# Patient Record
Sex: MALE | URBAN - METROPOLITAN AREA
[De-identification: names, ages, dates, MRNs, and addresses within clinical notes are randomized per-mention and may not be internally consistent; named-entity substitution may affect disease eponyms.]

---

## 2017-01-06 ENCOUNTER — TRANSFERRED RECORDS (OUTPATIENT)
Dept: HEALTH INFORMATION MANAGEMENT | Facility: CLINIC | Age: 42
End: 2017-01-06

## 2017-03-09 ENCOUNTER — MEDICAL CORRESPONDENCE (OUTPATIENT)
Dept: HEALTH INFORMATION MANAGEMENT | Facility: CLINIC | Age: 42
End: 2017-03-09

## 2017-03-15 ENCOUNTER — PRE VISIT (OUTPATIENT)
Dept: NEUROLOGY | Facility: CLINIC | Age: 42
End: 2017-03-15

## 2017-03-15 NOTE — TELEPHONE ENCOUNTER
1.  Date/reason for appt: 3/27/17 muscle cramps & occasional fasciculations   2.  Referring provider: DONNA DUMAS   3.  Call to patient (Yes / No - short description): no, referral   4.  Previous care at / records requested from: [facility]   5.  Other: Records received from [facility].   Included  Office notes: 1/6/17  Other: EMG report on 1/6/17    Faxed request for additional records.

## 2017-03-17 NOTE — TELEPHONE ENCOUNTER
Imaging disc received from [facility], sent to film room  [facility]  --  10/18/15

## 2017-03-19 ASSESSMENT — ENCOUNTER SYMPTOMS
HEARTBURN: 0
LEG PAIN: 1
RECTAL PAIN: 0
MUSCLE WEAKNESS: 1
HEADACHES: 0
VOMITING: 0
HYPOTENSION: 0
NERVOUS/ANXIOUS: 1
WEAKNESS: 0
ALTERED TEMPERATURE REGULATION: 1
FATIGUE: 1
STIFFNESS: 1
CHILLS: 0
DECREASED CONCENTRATION: 0
DIARRHEA: 1
CONSTIPATION: 1
BLOATING: 1
RECTAL BLEEDING: 0
SLEEP DISTURBANCES DUE TO BREATHING: 0
NIGHT SWEATS: 1
TREMORS: 0
DECREASED APPETITE: 0
INCREASED ENERGY: 1
BLOOD IN STOOL: 0
SEIZURES: 0
POLYPHAGIA: 1
JOINT SWELLING: 0
HYPERTENSION: 0
ABDOMINAL PAIN: 1
HALLUCINATIONS: 0
MUSCLE CRAMPS: 1
BOWEL INCONTINENCE: 0
JAUNDICE: 0
LOSS OF CONSCIOUSNESS: 0
LEG SWELLING: 1
INSOMNIA: 1
MYALGIAS: 1
MEMORY LOSS: 0
CLAUDICATION: 0
DIZZINESS: 0
PARALYSIS: 0
DEPRESSION: 0
EXERCISE INTOLERANCE: 0
LIGHT-HEADEDNESS: 0
TACHYCARDIA: 0
NAUSEA: 0
PALPITATIONS: 0
ARTHRALGIAS: 1
TINGLING: 0
DISTURBANCES IN COORDINATION: 0
SPEECH CHANGE: 1
WEIGHT GAIN: 0
FEVER: 0
BACK PAIN: 0
WEIGHT LOSS: 0
SYNCOPE: 0
ORTHOPNEA: 0
POLYDIPSIA: 0
NECK PAIN: 0
PANIC: 1
NUMBNESS: 0

## 2017-03-27 ENCOUNTER — OFFICE VISIT (OUTPATIENT)
Dept: NEUROLOGY | Facility: CLINIC | Age: 42
End: 2017-03-27

## 2017-03-27 VITALS
WEIGHT: 251 LBS | RESPIRATION RATE: 20 BRPM | SYSTOLIC BLOOD PRESSURE: 148 MMHG | HEART RATE: 93 BPM | HEIGHT: 71 IN | TEMPERATURE: 98.2 F | DIASTOLIC BLOOD PRESSURE: 87 MMHG | BODY MASS INDEX: 35.14 KG/M2 | OXYGEN SATURATION: 94 %

## 2017-03-27 DIAGNOSIS — R25.3 BENIGN FASCICULATION-CRAMP SYNDROME: Primary | ICD-10-CM

## 2017-03-27 DIAGNOSIS — R25.3 BENIGN FASCICULATION-CRAMP SYNDROME: ICD-10-CM

## 2017-03-27 DIAGNOSIS — R20.9 DISTURBANCE OF SKIN SENSATION: ICD-10-CM

## 2017-03-27 RX ORDER — FLUTICASONE PROPIONATE 50 MCG
1 SPRAY, SUSPENSION (ML) NASAL PRN
COMMUNITY
Start: 2013-04-01

## 2017-03-27 RX ORDER — AMITRIPTYLINE HYDROCHLORIDE 10 MG/1
3 TABLET ORAL DAILY
COMMUNITY
Start: 2016-10-17

## 2017-03-27 RX ORDER — CARBAMAZEPINE 100 MG/1
2 TABLET, EXTENDED RELEASE ORAL 2 TIMES DAILY
COMMUNITY
Start: 2017-01-06 | End: 2018-12-13

## 2017-03-27 ASSESSMENT — PAIN SCALES - GENERAL: PAINLEVEL: MILD PAIN (3)

## 2017-03-27 NOTE — LETTER
3/27/2017       RE: [facility] Blair  [address] [facility] [facility] [address]     Dear Colleague,    Thank you for referring your patient, Yaron Blair, to the [facility] NEUROLOGY at [facility]. Please see a copy of my visit note below.    Answers for HPI/ROS submitted by the patient on 3/19/2017   General Symptoms: Yes  Skin Symptoms: No  HENT Symptoms: No  EYE SYMPTOMS: No  HEART SYMPTOMS: Yes  LUNG SYMPTOMS: No  INTESTINAL SYMPTOMS: Yes  URINARY SYMPTOMS: No  REPRODUCTIVE SYMPTOMS: No  SKELETAL SYMPTOMS: Yes  BLOOD SYMPTOMS: No  NERVOUS SYSTEM SYMPTOMS: Yes  MENTAL HEALTH SYMPTOMS: Yes  Fever: No  Loss of appetite: No  Weight loss: No  Weight gain: No  Fatigue: Yes  Night sweats: Yes  Chills: No  Increased stress: No  Excessive hunger: Yes  Excessive thirst: No  Feeling hot or cold when others believe the temperature is normal: Yes  Loss of height: No  Post-operative complications: No  Surgical site pain: No  Hallucinations: No  Change in or Loss of Energy: Yes  Hyperactivity: No  Confusion: No  Chest pain or pressure: No  Fast or irregular heartbeat: No  Pain in legs with walking: Yes  Swelling in feet or ankles: Yes  Trouble breathing while lying down: No  Fingers or Toes appear blue: No  High blood pressure: No  Low blood pressure: No  Fainting: No  Murmurs: No  Chest pain on exertion: No  Chest pain at rest: No  Cramping pain in leg during exercise: No  Pacemaker: No  Varicose veins: No  Edema or swelling: Yes  Fast heart beat: No  Wake up at night with shortness of breath: No  Heart flutters: No  Light-headedness: No  Exercise intolerance: No  Heart burn or indigestion: No  Nausea: No  Vomiting: No  Abdominal pain: Yes  Bloating: Yes  Constipation: Yes  Diarrhea: Yes  Blood in stool: No  Black stools: No  Rectal or Anal pain: No  Fecal incontinence: No  Rectal bleeding: No  Yellowing of skin or eyes: No  Vomit with blood: No  Change in stools: No  Hemorrhoids: No  Back pain:  No  Muscle aches: Yes  Neck pain: No  Swollen joints: No  Joint pain: Yes  Bone pain: No  Muscle cramps: Yes  Muscle weakness: Yes  Joint stiffness: Yes  Bone fracture: No  Trouble with coordination: No  Dizziness or trouble with balance: No  Fainting or black-out spells: No  Memory loss: No  Headache: No  Seizures: No  Speech problems: Yes  Tingling: No  Tremor: No  Weakness: No  Difficulty walking: No  Paralysis: No  Numbness: No  Nervous or Anxious: Yes  Depression: No  Trouble sleeping: Yes  Trouble thinking or concentrating: No  Mood changes: No  Panic attacks: Yes      2017      Chriss Jerez MD   [facility]    [address]   [place] [address]      RE: Yaron Blair    MRN: [id number]    : 1975      Dear Dr. Jerez:      Thank you for referring Mr. Blair to the [facility] today.  As you know, he is a very pleasant 42-year-old man who gives the following story:  About 4 years ago, he developed intermittent tingling in his hands and feet and a sensation of stiffness, mostly in his calf muscles and occasionally the forearms.  Two years ago he noticed some fatigue when going up steps or trying to lift heavy objects and after exercise he would experience the same muscle stiffness in forearm, shoulders, hips and thighs, but again, mostly in the calves.  For the last year or so, his right calf has been frequently twitching and less so the left.  He has intermittent twitching in other muscles of the body and the same stiff sensation.  The tingling in the feet also returned.  It remains intermittent.  It does not occur at night.  It is mostly when he is up on his feet.  He is also experiencing random or evanescent jaw pains and neck stiffness.  He does not recall difficulty opening his .  He also noticed thinning of the muscles on the left hand and forearm.  He does not report any paresthesias in the perineal region, trunk, face or  upper extremities now.  There is no incontinence of bowel or bladder.  No dysphagia, dysarthria, sialorrhea, head drop, ptosis, diplopia or any other cranial nerve symptoms.  He does not endorse any difficulties using his lower extremities.  He does not have trouble getting up from a low seated chair or ascending stairs, turning in bed, no tripping on the feet or falls. He does not drop things from the hands and reports no difficulties flexing his elbow or lifting his arms overhead.  He denies symptoms suggestive of gastroparesis, orthostatic intolerance or syncope, dry eyes, dry mouth or erectile dysfunction.  He has no other autonomic symptoms.  He has obstructive sleep apnea for 12 years on CPAP treatment.  He had a repeat sleep study 5 years ago and things were going well.  Also, he has a history of acid reflux on omeprazole.      In the past, he was given amitriptyline by a rheumatologist for the purpose of consolidating his sleep.  It has not worked very well.  He previously tried gabapentin 1 pill in the evening only to help his sleep with poor effect.  He has not been given t.i.d. gabapentin or Lyrica with the intent to treat the muscle stiffness or the twitching.  You started him on Tegretol initially 100 b.i.d. with limited to no relief.  Now, he is on 200 mg b.i.d. for the last week without substantial changes.  He has no side effects from the Tegretol.      EMG performed at [facility] on 01/06/2017 was entirely normal at the left upper and right lower extremity with only occasional fasciculations in the left biceps, right gastrocnemius and vastus medialis, which can occur in normal individuals.  There was no evidence of sensory motor neuropathy or myopathy.  His CK was 262, which frankly for a well built man in his 40s is normal. There is a history of an elevated VERNON.  He had negative myasthenia antibodies, TSH, B12 and vitamin D.        PAST MEDICAL HISTORY:   Significant for obstructive  sleep apnea, appendectomy and ankle surgeries, 1993 and 1994.      CURRENT MEDICATIONS:   1.  Amitriptyline.     2.  Flonase.     3.  Omeprazole.     4.  Tegretol 200 mg b.i.d.      ALLERGIES:  Penicillin, reaction unknown, and Cipro, reaction shoulder pain.      REVIEW OF SYSTEMS:  A 14-point review of systems is negative except per HPI.      FAMILY HISTORY:  Negative for similar complaints or neurodegenerative disease such as ALS, negative for myopathy.      SOCIAL HISTORY:   He does not smoke, no alcohol use, no illicit drug use.      PHYSICAL EXAMINATION:   VITAL SIGNS:   His blood pressure is 148/87.  Pulse is 93 and regular.  He weighs 113 kg.  Height is 180.  Respiratory rate 20, O2 sat 94% on room air.  Pain score 3/10.   NEUROLOGIC:  He is awake, alert and oriented x3.  Cranial nerve examination II-XII are normal.  Motor exam shows normal muscle strength, tone and bulk throughout.  Reflexes were 1+ and symmetric in biceps, triceps, brachioradialis, 2 at the knees and ankles.  Plantar responses were bilaterally flexor.  He had no Crespo reflex or other pathologic reflexes.  I actually did not see any fasciculations in arms and legs and no muscle atrophy.  He had no hand , thenar or extensor digitorum communis percussion myotonia, no lid myotonia or lid lag.  He had a bulky muscle physique overall.  His sensory exam showed intact position sensation in the toes.  Vibration was probably a little reduced, 4-5 seconds at the right great toe, 6 at the left, 8 seconds at the medial malleoli, and 18-19 at the index fingers (normal).  Pinprick was probably slightly reduced at the tips of the toes compared to the mid-calves and this finding was reproducible on multiple attempts.  Coordination was intact in finger-to-nose.  I did not see any tremor.  There was no bradykinesia, or cogwheel rigidity.  He was able to get up from a chair with arms crossed on the chest without difficulty.  Tandem gait was normal.   Romberg sign negative.      IMPRESSION:   1.  Benign cramp fasciculation syndrome.   2.  Rule out small fiber neuropathy.      I spent about 35-40 minutes with the patient, of which more than half was counseling.       I told him that we often do not find a satisfactory explanation for people complaining of intermittent muscle stiffness and tingling when their neuro exams are unremarkable.  I agree with the concern about cramp fasciculation syndrome.  I told him that this is a benign yet somewhat annoying situation that is managed symptomatically.  It is caused by peripheral nerve hyperexcitability and not by muscle disease.  The cause of this hyperexcitability is most of the times unknown. Sometimes there is a family history or trait, sometimes it can be autoimmune and the latter diagnosis should not be missed.  Along those lines, I will check voltage gated potassium channel antibodies today.    His neuro exam shows a mild reduction of pin and tactile sensation in the toes.  He may have a mild small fiber neuropathy despite the negative EMG.  I think it is worth doing a skin biopsy for a couple of reasons.  If this is confirmed, then it predicts a better chance of response of his symptoms to medications such as Tegretol, Lyrica, etc.  If neuropathy is found, I think we should also do some additional blood testing including protein immunofixation and an oral glucose tolerance test for diabetes or further work him up for systemic autoimmune diseases.      I will carry the above plan.  If nothing is found, he will return to your care.  I concur with your decision to use Tegretol and I personally would push the dose to 400 mg b.i.d. to t.i.d. before concluding it does not work.  Liver function tests and sodium need to be monitored with the higher doses and there is an increased risk of dizziness or CNS side effects with the higher dose.  If Tegretol is not effective, consider Lyrica or putting him back on a t.i.d.  Gabapentin regimen.      Thank you for allowing me to participate in the care of this patient.  His CK of 262 is probably normal and I have no suspicion for muscle disease in this case (therefore muscle biopsy would be of no utility).      Sincerely,       Aquilino Webster MD      cc:   Daniella Ornelas MD      D: 2017 11:47   T: 2017 14:44   MT: GAV[place]OTA      Name:     MUSA PRESCOTT   MRN:      [id number]        Account:      [id number]   :      1975           Service Date: 2017      Document: [id number]

## 2017-03-27 NOTE — NURSING NOTE
Chief Complaint   Patient presents with     Consult     UMP- MUSCLE WEAKNESS AND PAIN IN 4 EXTREMITIES

## 2017-03-27 NOTE — MR AVS SNAPSHOT
After Visit Summary   3/27/2017    Yaron Blair    MRN: [id number]           Patient Information     Date Of Birth          1975        Visit Information        Provider Department      3/27/2017 10:50 AM Aquilino Webster MD [facility] Neurology        Care Instructions    If you have questions or concerns following today's appointment, please contact   Pre[facility]inski at [phone].    For more urgent concerns, contact the neurology department triage line at [phone] option 3.    Follow up with Dr. Jerez.    Referrals/Ordered provided today: Labs and Skin Biopsy    Medication: Recommend that Dr. Jerez try increasing Tegretol OR try Lyrica.     We now have a  available to help patients with psychosocial needs, supportive counseling, advanced care planning, and insurance and/or disability questions. You can reach OLVIN Acosta, TARYN at [phone].             Follow-ups after your visit        Who to contact     Please call your clinic at [phone] to:    Ask questions about your health    Make or cancel appointments    Discuss your medicines    Learn about your test results    Speak to your doctor   If you have compliments or concerns about an experience at your clinic, or if you wish to file a complaint, please contact [facility] Patient Relations at [phone] or email us at [email or web address]         Additional Information About Your Visit        MyChart Information     "[employer] gives you secure access to your electronic health record. If you see a primary care provider, you can also send messages to your care team and make appointments. If you have questions, please call your primary care clinic.  If you do not have a primary care provider, please call [phone] and they will assist you.      "[employer] is an electronic gateway that provides easy, online access to your medical records. With "[employer] you can  "request a clinic appointment, read your test results, renew a prescription or communicate with your care team.     To access your existing account, please contact your [facility] or call [phone] for assistance.        Care EveryWhere ID     This is your Care EveryWhere ID. This could be used by other organizations to access your [facility] medical records  [id number]        Your Vitals Were     Pulse Temperature Respirations Height Pulse Oximetry BMI (Body Mass Index)    93 98.2  F (36.8  C) (Oral) 20 1.803 m (5' 11\") 94% 35.01 kg/m2       Blood Pressure from Last 3 Encounters:   03/27/17 148/87    Weight from Last 3 Encounters:   03/27/17 113.9 kg (251 lb)              Today, you had the following     No orders found for display       Primary Care Provider Office Phone # Fax #    Daniella Ornelas [phone]       [address] [address]        Thank you!     Thank you for choosing [facility] NEUROLOGY  for your care. Our goal is always to provide you with excellent care. Hearing back from our patients is one way we can continue to improve our services. Please take a few minutes to complete the written survey that you may receive in the mail after your visit with us. Thank you!             Your Updated Medication List - Protect others around you: Learn how to safely use, store and throw away your medicines at www.disposemymeds.org.          This list is accurate as of: 3/27/17 11:28 AM.  Always use your most recent med list.                   Brand Name Dispense Instructions for use    amitriptyline 10 MG tablet    ELAVIL     Take 3 tablets by mouth daily       carBAMazepine 100 MG 12 hr tablet    TEGretol XR     Take 2 tablets by mouth 2 times daily       FLONASE 50 MCG/ACT spray   Generic drug:  fluticasone      Spray 1 puff in nostril as needed       omeprazole 20 MG CR capsule    priLOSEC     Take 1 capsule by mouth daily         "

## 2017-03-27 NOTE — PATIENT INSTRUCTIONS
If you have questions or concerns following today's appointment, please contact   Pre[facility]i Kaminski at [phone].    For more urgent concerns, contact the neurology department triage line at [phone] option 3.    Follow up with Dr. Jerez.    Referrals/Ordered provided today: Labs and Skin Biopsy    Medication: Recommend that Dr. Jerez try increasing Tegretol OR try Lyrica.     We now have a  available to help patients with psychosocial needs, supportive counseling, advanced care planning, and insurance and/or disability questions. You can reach OLVIN Acosta, [facility] at [phone].

## 2017-03-27 NOTE — PROGRESS NOTES
2017      Chriss Jerez MD   [facility] Neurological [address]   [place] [address]      RE: Yaron Blair    MRN: [id number]    : 1975      Dear Dr. Jerez:      Thank you for referring Mr. Blair to the [facility]romuscular Clinic today.  As you know, he is a very pleasant 42-year-old man who gives the following story:  About 4 years ago, he developed intermittent tingling in his hands and feet and a sensation of stiffness, mostly in his calf muscles and occasionally the forearms.  Two years ago he noticed some fatigue when going up steps or trying to lift heavy objects and after exercise he would experience the same muscle stiffness in forearm, shoulders, hips and thighs, but again, mostly in the calves.  For the last year or so, his right calf has been frequently twitching and less so the left.  He has intermittent twitching in other muscles of the body and the same stiff sensation.  The tingling in the feet also returned.  It remains intermittent.  It does not occur at night.  It is mostly when he is up on his feet.  He is also experiencing random or evanescent jaw pains and neck stiffness.  He does not recall difficulty opening his .  He also noticed thinning of the muscles on the left hand and forearm.  He does not report any paresthesias in the perineal region, trunk, face or upper extremities now.  There is no incontinence of bowel or bladder.  No dysphagia, dysarthria, sialorrhea, head drop, ptosis, diplopia or any other cranial nerve symptoms.  He does not endorse any difficulties using his lower extremities.  He does not have trouble getting up from a low seated chair or ascending stairs, turning in bed, no tripping on the feet or falls. He does not drop things from the hands and reports no difficulties flexing his elbow or lifting his arms overhead.  He denies symptoms suggestive of gastroparesis, orthostatic intolerance or syncope, dry  eyes, dry mouth or erectile dysfunction.  He has no other autonomic symptoms.  He has obstructive sleep apnea for 12 years on CPAP treatment.  He had a repeat sleep study 5 years ago and things were going well.  Also, he has a history of acid reflux on omeprazole.      In the past, he was given amitriptyline by a rheumatologist for the purpose of consolidating his sleep.  It has not worked very well.  He previously tried gabapentin 1 pill in the evening only to help his sleep with poor effect.  He has not been given t.i.d. gabapentin or Lyrica with the intent to treat the muscle stiffness or the twitching.  You started him on Tegretol initially 100 b.i.d. with limited to no relief.  Now, he is on 200 mg b.i.d. for the last week without substantial changes.  He has no side effects from the Tegretol.      EMG performed at [facility] on 01/06/2017 was entirely normal at the left upper and right lower extremity with only occasional fasciculations in the left biceps, right gastrocnemius and vastus medialis, which can occur in normal individuals.  There was no evidence of sensory motor neuropathy or myopathy.  His CK was 262, which frankly for a well built man in his 40s is normal. There is a history of an elevated VERNON.  He had negative myasthenia antibodies, TSH, B12 and vitamin D.        PAST MEDICAL HISTORY:   Significant for obstructive sleep apnea, appendectomy and ankle surgeries, 1993 and 1994.      CURRENT MEDICATIONS:   1.  Amitriptyline.     2.  Flonase.     3.  Omeprazole.     4.  Tegretol 200 mg b.i.d.      ALLERGIES:  Penicillin, reaction unknown, and Cipro, reaction shoulder pain.      REVIEW OF SYSTEMS:  A 14-point review of systems is negative except per HPI.      FAMILY HISTORY:  Negative for similar complaints or neurodegenerative disease such as ALS, negative for myopathy.      SOCIAL HISTORY:   He does not smoke, no alcohol use, no illicit drug use.      PHYSICAL EXAMINATION:   VITAL  SIGNS:   His blood pressure is 148/87.  Pulse is 93 and regular.  He weighs 113 kg.  Height is 180.  Respiratory rate 20, O2 sat 94% on room air.  Pain score 3/10.   NEUROLOGIC:  He is awake, alert and oriented x3.  Cranial nerve examination II-XII are normal.  Motor exam shows normal muscle strength, tone and bulk throughout.  Reflexes were 1+ and symmetric in biceps, triceps, brachioradialis, 2 at the knees and ankles.  Plantar responses were bilaterally flexor.  He had no Crespo reflex or other pathologic reflexes.  I actually did not see any fasciculations in arms and legs and no muscle atrophy.  He had no hand , thenar or extensor digitorum communis percussion myotonia, no lid myotonia or lid lag.  He had a bulky muscle physique overall.  His sensory exam showed intact position sensation in the toes.  Vibration was probably a little reduced, 4-5 seconds at the right great toe, 6 at the left, 8 seconds at the medial malleoli, and 18-19 at the index fingers (normal).  Pinprick was probably slightly reduced at the tips of the toes compared to the mid-calves and this finding was reproducible on multiple attempts.  Coordination was intact in finger-to-nose.  I did not see any tremor.  There was no bradykinesia, or cogwheel rigidity.  He was able to get up from a chair with arms crossed on the chest without difficulty.  Tandem gait was normal.  Romberg sign negative.      IMPRESSION:   1.  Benign cramp fasciculation syndrome.   2.  Rule out small fiber neuropathy.      I spent about 35-40 minutes with the patient, of which more than half was counseling.       I told him that we often do not find a satisfactory explanation for people complaining of intermittent muscle stiffness and tingling when their neuro exams are unremarkable.  I agree with the concern about cramp fasciculation syndrome.  I told him that this is a benign yet somewhat annoying situation that is managed symptomatically.  It is caused by  peripheral nerve hyperexcitability and not by muscle disease.  The cause of this hyperexcitability is most of the times unknown. Sometimes there is a family history or trait, sometimes it can be autoimmune and the latter diagnosis should not be missed.  Along those lines, I will check voltage gated potassium channel antibodies today.    His neuro exam shows a mild reduction of pin and tactile sensation in the toes.  He may have a mild small fiber neuropathy despite the negative EMG.  I think it is worth doing a skin biopsy for a couple of reasons.  If this is confirmed, then it predicts a better chance of response of his symptoms to medications such as Tegretol, Lyrica, etc.  If neuropathy is found, I think we should also do some additional blood testing including protein immunofixation and an oral glucose tolerance test for diabetes or further work him up for systemic autoimmune diseases.      I will carry the above plan.  If nothing is found, he will return to your care.  I concur with your decision to use Tegretol and I personally would push the dose to 400 mg b.i.d. to t.i.d. before concluding it does not work.  Liver function tests and sodium need to be monitored with the higher doses and there is an increased risk of dizziness or CNS side effects with the higher dose.  If Tegretol is not effective, consider Lyrica or putting him back on a t.i.d. Gabapentin regimen.      Thank you for allowing me to participate in the care of this patient.  His CK of 262 is probably normal and I have no suspicion for muscle disease in this case (therefore muscle biopsy would be of no utility).      Sincerely,       Flaquita Webster MD      cc:   MD FLAQUITA Morales MD             D: 2017 11:47   T: 2017 14:44   MT: GAVIOTA      Name:     MUSA PRESCOTT   MRN:      [id number]        Account:      [id number]   :      1975           Service Date: 2017      Document:  [id number]

## 2017-03-28 NOTE — PROGRESS NOTES
Answers for HPI/ROS submitted by the patient on 3/19/2017   General Symptoms: Yes  Skin Symptoms: No  HENT Symptoms: No  EYE SYMPTOMS: No  HEART SYMPTOMS: Yes  LUNG SYMPTOMS: No  INTESTINAL SYMPTOMS: Yes  URINARY SYMPTOMS: No  REPRODUCTIVE SYMPTOMS: No  SKELETAL SYMPTOMS: Yes  BLOOD SYMPTOMS: No  NERVOUS SYSTEM SYMPTOMS: Yes  MENTAL HEALTH SYMPTOMS: Yes  Fever: No  Loss of appetite: No  Weight loss: No  Weight gain: No  Fatigue: Yes  Night sweats: Yes  Chills: No  Increased stress: No  Excessive hunger: Yes  Excessive thirst: No  Feeling hot or cold when others believe the temperature is normal: Yes  Loss of height: No  Post-operative complications: No  Surgical site pain: No  Hallucinations: No  Change in or Loss of Energy: Yes  Hyperactivity: No  Confusion: No  Chest pain or pressure: No  Fast or irregular heartbeat: No  Pain in legs with walking: Yes  Swelling in feet or ankles: Yes  Trouble breathing while lying down: No  Fingers or Toes appear blue: No  High blood pressure: No  Low blood pressure: No  Fainting: No  Murmurs: No  Chest pain on exertion: No  Chest pain at rest: No  Cramping pain in leg during exercise: No  Pacemaker: No  Varicose veins: No  Edema or swelling: Yes  Fast heart beat: No  Wake up at night with shortness of breath: No  Heart flutters: No  Light-headedness: No  Exercise intolerance: No  Heart burn or indigestion: No  Nausea: No  Vomiting: No  Abdominal pain: Yes  Bloating: Yes  Constipation: Yes  Diarrhea: Yes  Blood in stool: No  Black stools: No  Rectal or Anal pain: No  Fecal incontinence: No  Rectal bleeding: No  Yellowing of skin or eyes: No  Vomit with blood: No  Change in stools: No  Hemorrhoids: No  Back pain: No  Muscle aches: Yes  Neck pain: No  Swollen joints: No  Joint pain: Yes  Bone pain: No  Muscle cramps: Yes  Muscle weakness: Yes  Joint stiffness: Yes  Bone fracture: No  Trouble with coordination: No  Dizziness or trouble with balance: No  Fainting or black-out spells:  No  Memory loss: No  Headache: No  Seizures: No  Speech problems: Yes  Tingling: No  Tremor: No  Weakness: No  Difficulty walking: No  Paralysis: No  Numbness: No  Nervous or Anxious: Yes  Depression: No  Trouble sleeping: Yes  Trouble thinking or concentrating: No  Mood changes: No  Panic attacks: Yes

## 2017-04-03 ENCOUNTER — TELEPHONE (OUTPATIENT)
Dept: NEUROLOGY | Facility: CLINIC | Age: 42
End: 2017-04-03

## 2017-04-03 LAB — VGKC AB SER-SCNC: 41 PMOL/L

## 2017-04-03 NOTE — TELEPHONE ENCOUNTER
Voltage gated potassium came back positive but only mild. So, we need to do the CASPR2 antibody test to clarify this result.    If this test is negative, the original voltage gated potassium should be dismissed and patient should follow with Dr. Jerez.    If this test is positive, patient should follow up with Dr. Webster in Monday [facility] clinic.

## 2017-04-05 ENCOUNTER — CARE COORDINATION (OUTPATIENT)
Dept: NEUROLOGY | Facility: CLINIC | Age: 42
End: 2017-04-05

## 2017-04-05 DIAGNOSIS — R25.3 BENIGN FASCICULATION-CRAMP SYNDROME: Primary | ICD-10-CM

## 2017-04-05 NOTE — PROGRESS NOTES
Orders were faxed to [facility] for a home draw.      Cynthia Montano MS, [facility]  Genetic Counselor  Phone: [phone]

## 2017-04-12 ENCOUNTER — TELEPHONE (OUTPATIENT)
Dept: NEUROLOGY | Facility: CLINIC | Age: 42
End: 2017-04-12

## 2017-04-13 NOTE — TELEPHONE ENCOUNTER
Lab is unable to draw at home because of his insurance.  Called to make arrangements to draw at local clinic.     Cynthia Montano MS, [facility]  Genetic Counselor  Phone: [phone]

## 2017-04-18 ENCOUNTER — TELEPHONE (OUTPATIENT)
Dept: NEUROLOGY | Facility: CLINIC | Age: 42
End: 2017-04-18

## 2017-04-18 ENCOUNTER — OFFICE VISIT (OUTPATIENT)
Dept: NEUROLOGY | Facility: CLINIC | Age: 42
End: 2017-04-18

## 2017-04-18 DIAGNOSIS — R20.9 DISTURBANCE OF SKIN SENSATION: ICD-10-CM

## 2017-04-18 DIAGNOSIS — R25.3 BENIGN FASCICULATION-CRAMP SYNDROME: ICD-10-CM

## 2017-04-18 LAB — MISCELLANEOUS TEST: NORMAL

## 2017-04-18 PROCEDURE — 88305 TISSUE EXAM BY PATHOLOGIST: CPT | Performed by: PSYCHIATRY & NEUROLOGY

## 2017-04-18 PROCEDURE — 88348 ELECTRON MICROSCOPY DX: CPT | Performed by: PSYCHIATRY & NEUROLOGY

## 2017-04-18 PROCEDURE — 88314 HISTOCHEMICAL STAINS ADD-ON: CPT | Performed by: PSYCHIATRY & NEUROLOGY

## 2017-04-18 PROCEDURE — 88346 IMFLUOR 1ST 1ANTB STAIN PX: CPT | Performed by: PSYCHIATRY & NEUROLOGY

## 2017-04-18 PROCEDURE — 88350 IMFLUOR EA ADDL 1ANTB STN PX: CPT | Performed by: PSYCHIATRY & NEUROLOGY

## 2017-04-18 PROCEDURE — 88356 ANALYSIS NERVE: CPT | Performed by: PSYCHIATRY & NEUROLOGY

## 2017-04-18 NOTE — MR AVS SNAPSHOT
After Visit Summary   4/18/2017    Yraon Blair    MRN: [id number]           Patient Information     Date Of Birth          1975        Visit Information        Provider Department      4/18/2017 11:00 AM Aquilino Webster MD  [facility]        Today's Diagnoses     Disturbance of skin sensation           Follow-ups after your visit        Future tests that were ordered for you today     Open Future Orders        Priority Expected Expires Ordered    Epidermal Nerve Fiber Density Routine  4/19/2018 4/18/2017            Who to contact     Please call your clinic at [phone] to:    Ask questions about your health    Make or cancel appointments    Discuss your medicines    Learn about your test results    Speak to your doctor   If you have compliments or concerns about an experience at your clinic, or if you wish to file a complaint, please contact [facility] Patient Relations at [phone] or email us at [email or web address]         Additional Information About Your Visit        MyChart Information     FindYogit gives you secure access to your electronic health record. If you see a primary care provider, you can also send messages to your care team and make appointments. If you have questions, please call your primary care clinic.  If you do not have a primary care provider, please call [phone] and they will assist you.      Sweet P's is an electronic gateway that provides easy, online access to your medical records. With Sweet P[employer], you can request a clinic appointment, read your test results, renew a prescription or communicate with your care team.     To access your existing account, please contact your [facility] Clinic or call [phone] for assistance.        Care EveryWhere ID     This is your Care EveryWhere ID. This could be used by other organizations to access your [place] medical records  [id number]          Blood Pressure from Last 3 Encounters:   03/27/17 148/87    Weight from Last 3 Encounters:   03/27/17 113.9 kg (251 lb)              We Performed the Following     Biopsy Skin/Subcutaneous/Mucous Membrane, ea. Addtl. Lesion (44581)     Biopsy Skin/Subcutaneous/Mucous Membrane, Single Lesion (30510)     EMG        Primary Care Provider Office Phone # Fax #    Daniella Ornelas [phone]       [address] [address]        Thank you!     Thank you for choosing [facility]  for your care. Our goal is always to provide you with excellent care. Hearing back from our patients is one way we can continue to improve our services. Please take a few minutes to complete the written survey that you may receive in the mail after your visit with us. Thank you!             Your Updated Medication List - Protect others around you: Learn how to safely use, store and throw away your medicines at www.disposemymeds.org.          This list is accurate as of: 4/18/17 11:30 AM.  Always use your most recent med list.                   Brand Name Dispense Instructions for use    amitriptyline 10 MG tablet    ELAVIL     Take 3 tablets by mouth daily       carBAMazepine 100 MG 12 hr tablet    TEGretol XR     Take 2 tablets by mouth 2 times daily       FLONASE 50 MCG/ACT spray   Generic drug:  fluticasone      Spray 1 puff in nostril as needed       omeprazole 20 MG CR capsule    priLOSEC     Take 1 capsule by mouth daily

## 2017-04-18 NOTE — LETTER
4/18/2017       RE: Yaron Blair  [address]  [facility] [address]     Dear Colleague,    Thank you for referring your patient, Yaron Blair, to the [facility]. Please see a copy of my visit note below.    Procedure note: Skin biopsy. Indication: sensory disturbance, skin. After obtaining informed consent, 3 mm skin biopsies were performed over the extensor digitorum brevis muscle of the left foot, and the left medial calf, about 10 cm distally to the medial knee joint. 1% lidocaine anesthesia and betadine sterile prep were used. The patient tolerated the procedure well. Wound care and patient education were provided by Kristy Behling, REEGT. Aquilino Webster MD        Again, thank you for allowing me to participate in the care of your patient.      Sincerely,    Aquilino Webster MD

## 2017-04-18 NOTE — PROGRESS NOTES
Procedure note: Skin biopsy. Indication: sensory disturbance, skin. After obtaining informed consent, 3 mm skin biopsies were performed over the extensor digitorum brevis muscle of the left foot, and the left medial calf, about 10 cm distally to the medial knee joint. 1% lidocaine anesthesia and betadine sterile prep were used. The patient tolerated the procedure well. Wound care and patient education were provided by Kristy Behling, REEGT. Aquilino Webster MD

## 2017-04-19 LAB
LOCATION PERFORMED: NORMAL
NORMAL RANGE FOR SEND OUTS MISC TEST: NORMAL
RESULT: NORMAL
SEND OUTS MISC TEST CODE: 499
SEND OUTS MISC TEST SPECIMEN: NORMAL
TEST NAME: NORMAL

## 2017-05-03 LAB — LAB SCANNED RESULT: NORMAL

## 2017-05-17 ENCOUNTER — TELEPHONE (OUTPATIENT)
Dept: NEUROLOGY | Facility: CLINIC | Age: 42
End: 2017-05-17

## 2017-05-17 DIAGNOSIS — G62.9 SMALL FIBER NEUROPATHY: Primary | ICD-10-CM

## 2017-05-17 LAB
LAB SCANNED RESULT: NORMAL
LAB SCANNED RESULT: NORMAL

## 2017-05-27 ENCOUNTER — TRANSFERRED RECORDS (OUTPATIENT)
Dept: HEALTH INFORMATION MANAGEMENT | Facility: CLINIC | Age: 42
End: 2017-05-27

## 2017-07-03 ENCOUNTER — OFFICE VISIT (OUTPATIENT)
Dept: NEUROLOGY | Facility: CLINIC | Age: 42
End: 2017-07-03

## 2017-07-03 VITALS — HEIGHT: 71 IN | HEART RATE: 73 BPM | DIASTOLIC BLOOD PRESSURE: 85 MMHG | SYSTOLIC BLOOD PRESSURE: 140 MMHG

## 2017-07-03 DIAGNOSIS — R25.3 BENIGN FASCICULATION-CRAMP SYNDROME: ICD-10-CM

## 2017-07-03 DIAGNOSIS — G62.9 SMALL FIBER NEUROPATHY: Primary | ICD-10-CM

## 2017-07-03 ASSESSMENT — PAIN SCALES - GENERAL: PAINLEVEL: NO PAIN (0)

## 2017-07-03 NOTE — MR AVS SNAPSHOT
After Visit Summary   7/3/2017    Yaron Blair    MRN: [id number]           Patient Information     Date Of Birth          1975        Visit Information        Provider Department      7/3/2017 5:20 PM Aquilino Webster MD [facility] Neurology        Care Instructions    If you have questions or concerns following today's appointment, please contact   Preethi Kaminski at [phone].    For more urgent concerns, contact the neurology department triage line at [phone] option 3.    Follow up with Dr. Webster in 3-4 months.  Medication: Increase Amitriptyline to 50mg daily     We now have a  available to help patients with psychosocial needs, supportive counseling, advanced care planning, and insurance and/or disability questions. You can reach OLVIN Acosta, [facility] at [phone].              Follow-ups after your visit        Your next 10 appointments already scheduled     Oct 16, 2017 11:20 AM CDT   (Arrive by 11:05 AM)   Return Muscular Dystrophy with Aquilino Webster MD   [facility] Neurology ([facility])    [address] [place] [id number] [phone]              Who to contact     Please call your clinic at [phone] to:    Ask questions about your health    Make or cancel appointments    Discuss your medicines    Learn about your test results    Speak to your doctor   If you have compliments or concerns about an experience at your clinic, or if you wish to file a complaint, please contact [facility] Patient Relations at [phone] or email us at [email or web address]         Additional Information About Your Visit        MyChart Information     Somewheret gives you secure access to your electronic health record. If you see a primary care provider, you can also send messages to your care team and make appointments. If you have questions, please call  "your primary care clinic.  If you do not have a primary care provider, please call [phone] and they will assist you.      FantasyHub is an electronic gateway that provides easy, online access to your medical records. With [employer]Hub, you can request a clinic appointment, read your test results, renew a prescription or communicate with your care team.     To access your existing account, please contact your [facility] or call [phone] for assistance.        Care EveryWhere ID     This is your Care EveryWhere ID. This could be used by other organizations to access your [place] medical records  [id number]        Your Vitals Were     Pulse Height                73 1.803 m (5' 11\")           Blood Pressure from Last 3 Encounters:   07/03/17 140/85   03/27/17 148/87    Weight from Last 3 Encounters:   03/27/17 113.9 kg (251 lb)              Today, you had the following     No orders found for display       Primary Care Provider Office Phone # Fax #    Daniella DO Ceci [phone]       [facility] [address] [facility] [address]        Equal Access to Services     EMILY [facility] HospitalELINA : Hadii aad ku hadasho Soomaali, waaxda luqadaha, qaybta kaalmada adeegyada, odette santiago haygermán valencia . So [facility] [phone].    ATENCIÓN: Si habla español, tiene a gomez disposición servicios gratuitos de asistencia lingüística. Llame al [phone].    We comply with applicable federal civil rights laws and [place] laws. We do not discriminate on the basis of race, color, national origin, age, disability sex, sexual orientation or gender identity.            Thank you!     Thank you for choosing [facility] NEUROLOGY  for your care. Our goal is always to provide you with excellent care. Hearing back from our patients is one way we can continue to improve our services. Please take a few minutes to complete the written survey that you may receive in the mail after your " visit with us. Thank you!             Your Updated Medication List - Protect others around you: Learn how to safely use, store and throw away your medicines at www.disposemymeds.org.          This list is accurate as of: 7/3/17  5:26 PM.  Always use your most recent med list.                   Brand Name Dispense Instructions for use Diagnosis    amitriptyline 10 MG tablet    ELAVIL     Take 3 tablets by mouth daily        carBAMazepine 100 MG 12 hr tablet    TEGretol XR     Take 2 tablets by mouth 2 times daily        FLONASE 50 MCG/ACT spray   Generic drug:  fluticasone      Spray 1 puff in nostril as needed        omeprazole 20 MG CR capsule    priLOSEC     Take 1 capsule by mouth daily

## 2017-07-03 NOTE — LETTER
7/3/2017       RE: Yaron Blair  [address]  [place] [address]     Dear Colleague,    Thank you for referring your patient, Yaron Blair, to the [facility] NEUROLOGY at [facility]. Please see a copy of my visit note below.    July 3, 2017       Chriss Jerez MD   [facility]    [address]   [place] [address]      Latrice Felton MD  [facility] Rheumatology  [facility]  [address], [facility] [address]  [place] [address]     RE:                Yaron Blair    MRN:             [id number]    :             1975       Dear Doctors:    I had the pleasure to see Mr Blair in follow up today at the [facility] Neuromuscular (MDA) Clinic. As you know, he is a very pleasant 42-year-old man with history of random muscle pains or discomfort at areas including the shoulders, calves, thighs or jaw, occasionally associated with fasciculations. His workup disclosed a positive VERNON of 1:640 last year, but negative SSA, SSB, RNP, Cara-1 and scleroderma antibodies, and normal complement levels. He also had a weakly positive voltage gated potassium channel antibody (41, indeterminate), but negative CASPR2 antibodies, and EMG repeated recently in our lab did not show striking evidence of peripheral nerve hyperexcitability. There was also no evidence of myopathy or motor neuron disease. When I first met with him in 3/27/2017, he had clinical evidence of a mild distal sensory neuropathy. Nerve conduction studies were normal, but skin biopsy confirmed length dependent small fiber neuropathy (skin nerve fiber density was reduced at the foot and normal at the calf). Additional testing for common causes of neuropathy, including TSH, B12 and protein electrophoresis, done at [facility] in , and rheumatoid factor, celiac disease serologies, oral glucose tolerance test and vitamin B6, done 3 months ago, were all unremarkable. Compared  "to the last time we met he feels his symptoms maybe a little worse, and he has a feeling of weakness after using his hands. He is on amitryptiline 25 mg daily and Tegretol 200 mg bid, which are not enough to control his discomfort. In the past, he tried gabapentin 1 pill in the evening only to help his sleep with poor effect.  He has not been given t.i.d. gabapentin or Lyrica with the intent to treat the muscle stiffness or the twitching.   He recently saw Dr Felton and a gradual increase in the amitryptiline dose to 50 mg daily was recommended.    Medications: Reviewed and are as per Epic record.    PHYSICAL EXAMINATION:   VITAL SIGNS:   /85  Pulse 73  Ht 1.803 m (5' 11\")     On a focused neuro exam he has normal muscle strength, tone and bulk in proximal and distal upper and lower extremities, and I did not appreciate fasciculations. His sensory exam shows reduced right ankle jerk (1+), 2+ left ankle jerk, and 1+ knee jerks elicitable only with Jendrassik maneuver. Biceps and triceps reflexes are low amplitude symmetric. His vibration sensation is 7 seconds at the left great toe, 2 at the right, 8-9 seconds at the left medial malleolus, 11 at the right, and over 20 seconds at the index fingers. Position sensation is normal at the toes. Gait is normal.     IMPRESSION: Mr Blair has multifocal muscle discomfort or twitching, and a very mild distal small fiber neuropathy. It is possible that the latter is the cause of the former, in the absence of an alternative explanation. The mildly positive VERNON and VGKC antibodies imply that the neuropathy may be immune-mediated, although the diagnostic criteria for well defined connective tissue disorders and Oz's syndrome are not met. At this point his symptoms are uncomfortable but not incapacitating or life threatening. I once again reassured him that he does not have ALS- because he had lingering concerns about this diagnosis which should be dismissed based on " previous evaluations. I concur with Dr Felton's recommendation and I asked him to stay on amitryptiline 50 mg qhs for 3 months and then return to our Clinic. If he is doing better, will gradually taper the carbamazepine. If not, a low dose oral prednisone would be a reasonable consideration.     I spent about 25 minutes with the patient, of which more than half was counseling.         cc:   MD FLAQUITA Morales MD

## 2017-07-03 NOTE — PROGRESS NOTES
July 3, 2017       Chriss Jerez MD   [facility] Neurological Clinic    [address]   [place] [address]      Latrice Felton MD  [facility] Rheumatology  [facility]  [address], [facility] [address]  [place] [address]     RE:                Yaron Blair    MRN:             [id number]    :             1975       Dear Doctors:    I had the pleasure to see Mr Blair in follow up today at the [facility] Neuromuscular (MDA) Clinic. As you know, he is a very pleasant 42-year-old man with history of random muscle pains or discomfort at areas including the shoulders, calves, thighs or jaw, occasionally associated with fasciculations. His workup disclosed a positive VERNON of 1:640 last year, but negative SSA, SSB, RNP, Cara-1 and scleroderma antibodies, and normal complement levels. He also had a weakly positive voltage gated potassium channel antibody (41, indeterminate), but negative CASPR2 antibodies, and EMG repeated recently in our lab did not show striking evidence of peripheral nerve hyperexcitability. There was also no evidence of myopathy or motor neuron disease. When I first met with him in 3/27/2017, he had clinical evidence of a mild distal sensory neuropathy. Nerve conduction studies were normal, but skin biopsy confirmed length dependent small fiber neuropathy (skin nerve fiber density was reduced at the foot and normal at the calf). Additional testing for common causes of neuropathy, including TSH, B12 and protein electrophoresis, done at [facility] in , and rheumatoid factor, celiac disease serologies, oral glucose tolerance test and vitamin B6, done 3 months ago, were all unremarkable. Compared to the last time we met he feels his symptoms maybe a little worse, and he has a feeling of weakness after using his hands. He is on amitryptiline 25 mg daily and Tegretol 200 mg bid, which are not enough to control his discomfort. In the past, he tried gabapentin 1 pill  "in the evening only to help his sleep with poor effect.  He has not been given t.i.d. gabapentin or Lyrica with the intent to treat the muscle stiffness or the twitching.  He recently saw Dr Felton and a gradual increase in the amitryptiline dose to 50 mg daily was recommended.    Medications: Reviewed and are as per Epic record.    PHYSICAL EXAMINATION:   VITAL SIGNS:   /85  Pulse 73  Ht 1.803 m (5' 11\")     On a focused neuro exam he has normal muscle strength, tone and bulk in proximal and distal upper and lower extremities, and I did not appreciate fasciculations. His sensory exam shows reduced right ankle jerk (1+), 2+ left ankle jerk, and 1+ knee jerks elicitable only with Jendrassik maneuver. Biceps and triceps reflexes are low amplitude symmetric. His vibration sensation is 7 seconds at the left great toe, 2 at the right, 8-9 seconds at the left medial malleolus, 11 at the right, and over 20 seconds at the index fingers. Position sensation is normal at the toes. Gait is normal.     IMPRESSION: Mr Blair has multifocal muscle discomfort or twitching, and a very mild distal small fiber neuropathy. It is possible that the latter is the cause of the former, in the absence of an alternative explanation. The mildly positive VERNON and VGKC antibodies imply that the neuropathy may be immune-mediated, although the diagnostic criteria for well defined connective tissue disorders and Oz's syndrome are not met. At this point his symptoms are uncomfortable but not incapacitating or life threatening. I once again reassured him that he does not have ALS- because he had lingering concerns about this diagnosis which should be dismissed based on previous evaluations. I concur with Dr Felton's recommendation and I asked him to stay on amitryptiline 50 mg qhs for 3 months and then return to our Clinic. If he is doing better, will gradually taper the carbamazepine. If not, a low dose oral prednisone would be a " reasonable consideration.     I spent about 25 minutes with the patient, of which more than half was counseling.            Sincerely,        Flaquita Webster MD       cc:   MD FLAQUITA Morales MD

## 2017-07-03 NOTE — NURSING NOTE
Chief Complaint   Patient presents with     RECHECK     Muscular dystrophy    Donnie Tena, LEXY

## 2017-07-03 NOTE — PATIENT INSTRUCTIONS
If you have questions or concerns following today's appointment, please contact   Preethi Kaminski at [phone].    For more urgent concerns, contact the neurology department triage line at [phone] option 3.    Follow up with Dr. Webster in 3-4 months.  Medication: Increase Amitriptyline to 50mg daily     We now have a  available to help patients with psychosocial needs, supportive counseling, advanced care planning, and insurance and/or disability questions. You can reach OLVIN Acosta, LICSW at [phone].

## 2017-10-23 ENCOUNTER — OFFICE VISIT (OUTPATIENT)
Dept: NEUROLOGY | Facility: CLINIC | Age: 42
End: 2017-10-23

## 2017-10-23 VITALS
DIASTOLIC BLOOD PRESSURE: 85 MMHG | HEIGHT: 72 IN | HEART RATE: 86 BPM | SYSTOLIC BLOOD PRESSURE: 151 MMHG | WEIGHT: 254 LBS | BODY MASS INDEX: 34.4 KG/M2

## 2017-10-23 DIAGNOSIS — R25.3 BENIGN FASCICULATION-CRAMP SYNDROME: Primary | ICD-10-CM

## 2017-10-23 DIAGNOSIS — G62.9 SMALL FIBER NEUROPATHY: ICD-10-CM

## 2017-10-23 RX ORDER — PREDNISONE 10 MG/1
30 TABLET ORAL DAILY
Qty: 90 TABLET | Refills: 0 | Status: SHIPPED | OUTPATIENT
Start: 2017-10-23 | End: 2017-11-18

## 2017-10-23 NOTE — MR AVS SNAPSHOT
After Visit Summary   10/23/2017    Yaron Blair    MRN: [id number]           Patient Information     Date Of Birth          1975        Visit Information        Provider Department      10/23/2017 4:20 PM Aquilino Webster MD [facility]        Today's Diagnoses     Benign fasciculation-cramp syndrome (H)    -  1       Follow-ups after your visit        Your next 10 appointments already scheduled     Oct 23, 2017  4:20 PM CDT   (Arrive by 4:05 PM)   Return Muscular Dystrophy with Aquilino Webster MD   [facility] ([facility])    [address] [place] [id number] [phone]            Dec 06, 2017  9:50 AM CST   (Arrive by 9:35 AM)   Return Neuropathy Visit with Aquilino Webster MD   [facility] ([facility])    [address] [place] [id number] [phone]              Who to contact     Please call your clinic at [phone] to:    Ask questions about your health    Make or cancel appointments    Discuss your medicines    Learn about your test results    Speak to your doctor   If you have compliments or concerns about an experience at your clinic, or if you wish to file a complaint, please contact [facility] Patient Relations at [phone] or email us at [email or web address]         Additional Information About Your Visit        MyChart Information     M[employer] gives you secure access to your electronic health record. If you see a primary care provider, you can also send messages to your care team and make appointments. If you have questions, please call your primary care clinic.  If you do not have a primary care provider, please call [phone] and they will assist you.      [employer] is an electronic gateway that provides easy, online access to your medical records. With [employer], you can  "request a clinic appointment, read your test results, renew a prescription or communicate with your care team.     To access your existing account, please contact your [facility] or call [phone] for assistance.        Care EveryWhere ID     This is your Care EveryWhere ID. This could be used by other organizations to access your [place] medical records  [id number]        Your Vitals Were     Pulse Height BMI (Body Mass Index)             86 1.816 m (5' 11.5\") 34.93 kg/m2          Blood Pressure from Last 3 Encounters:   10/23/17 151/85   07/03/17 140/85   03/27/17 148/87    Weight from Last 3 Encounters:   10/23/17 115.2 kg (254 lb)   03/27/17 113.9 kg (251 lb)              Today, you had the following     No orders found for display         Today's Medication Changes          These changes are accurate as of: 10/23/17  3:48 PM.  If you have any questions, ask your nurse or doctor.               Start taking these medicines.        Dose/Directions    predniSONE 10 MG tablet   Commonly known as:  DELTASONE   Used for:  Benign fasciculation-cramp syndrome (H)   Started by:  Aquilino Webster MD        Dose:  30 mg   Take 3 tablets (30 mg) by mouth daily   Quantity:  90 tablet   Refills:  0            Where to get your medicines      These medications were sent to [facility] [address]  [place], [place] - [address]  AT [facility] [address] [place] [address]     Phone:  [phone]     predniSONE 10 MG tablet                Primary Care Provider Office Phone # Fax #    Daniella DO Ceci [phone]       [facility] [address] [facility] [address] [facility] [address]        Equal Access to Services     CLARI ROJAS AH: Ronn Ingram, serina mckeon, andra kaalmada chrisda, odette larry. So [facility] [phone].    ATENCIÓN: Si habla español, tiene a gomez disposición servicios gratuitos de " asistencia lingüística. Germán al [phone].    We comply with applicable federal civil rights laws and [place] laws. We do not discriminate on the basis of race, color, national origin, age, disability, sex, sexual orientation, or gender identity.            Thank you!     Thank you for choosing [facility]  for your care. Our goal is always to provide you with excellent care. Hearing back from our patients is one way we can continue to improve our services. Please take a few minutes to complete the written survey that you may receive in the mail after your visit with us. Thank you!             Your Updated Medication List - Protect others around you: Learn how to safely use, store and throw away your medicines at www.disposemymeds.org.          This list is accurate as of: 10/23/17  3:48 PM.  Always use your most recent med list.                   Brand Name Dispense Instructions for use Diagnosis    amitriptyline 10 MG tablet    ELAVIL     Take 3 tablets by mouth daily        carBAMazepine 100 MG 12 hr tablet    TEGretol XR     Take 2 tablets by mouth 2 times daily        FLONASE 50 MCG/ACT spray   Generic drug:  fluticasone      Spray 1 puff in nostril as needed        omeprazole 20 MG CR capsule    priLOSEC     Take 1 capsule by mouth daily        predniSONE 10 MG tablet    DELTASONE    90 tablet    Take 3 tablets (30 mg) by mouth daily    Benign fasciculation-cramp syndrome (H)

## 2017-10-23 NOTE — LETTER
10/23/2017        RE: Yaron Blair  [address] [place] [address]     Dear Colleague,    Thank you for referring your patient, Yaron Blair, to the [facility] at [facility]. Please see a copy of my visit note below.    2017      Daniella Ornelas DO   [facility]    [address] [place] [address]      Latrice Felton MD   [facility]    [address], [address]   [place] [address]      Chriss Jerez MD   [facility]    [address]   [place] [address]      RE: Yaron Blair    MRN: [id number]   : 1975      Dear Doctors:      I had the pleasure to see Mr. Blair in followup today at the [facility].  He is a very pleasant 42-year-old man with a history of random muscle pains or discomfort in multiple areas including shoulders, calves, thighs or jaw, occasionally associated with fasciculations.  His workup disclosed a positive VERNON of 1:640 last year, negative more specific connective tissue disease antibodies, normal complement levels and weakly positive voltage gated potassium channel antibody (41, indeterminate), but negative CASPR2 antibodies.  EMG did not show evidence of peripheral nerve hyperexcitability, myopathy or motor neuron disease.  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  Workup for neuropathy causes was unrevealing.  He is on amitriptyline currently 50 mg daily and Tegretol 400 mg b.i.d.  He has not experienced any improvement unfortunately. and he continues to notice worsening of his symptoms with more muscle stiffness and pain during the day, including thighs, calves and upper extremities and a feeling of weakness, especially when he ascends stairs.  He has some difficulty clearing his throat sometimes but otherwise no new neurological symptoms.      CURRENT MEDICATIONS:   Reviewed and are as per Epic record.       PHYSICAL EXAMINATION:     VITAL SIGNS:   His blood pressure is 151/85.  Pulse 86 and regular.  His weight is 115.2 kg.  Height is 181.   NEUROLOGIC EXAMINATION:  He is awake, alert, oriented x3.  His motor exam shows absolutely normal muscle strength, tone and bulk throughout.  Reflexes are 1+ at bilateral ankles and knees.  Biceps and triceps are low amplitude and symmetric.  Vibration is asymmetric and it remains 2 seconds at the right great toe and almost 8-9 on the left.  Vibration is detected for a good 8-9 seconds at the right medial malleolus and 11 at the left, 15 seconds at the right index finger and 22 on the left.  Position sensation is normal.  Pinprick and light touch examinations were not done.     In summary, Mr. Prescott has multifocal muscle discomfort or twitching and a mild distal small fiber neuropathy.  It is possible there is an underlying autoimmune cause given the mildly positive VERNON and VGKC antibodies.  He has failed treatment with amitriptyline 50 mg daily and Tegretol 400 mg b.i.d.  I will offer him prednisone 30 mg daily for 1 month and he should return at that time for followup.  If he improves the prednisone dose will be tapered slowly.  If he does not improve then we will manage him symptomatically. Thank you for allowing me to participate in his care.      Sincerely,       Aquilino Webster MD      Total time spent for patient care 15 minutes, more than half was counseling.              D: 10/23/2017 15:50   T: 10/23/2017 16:07   MT: GAVIOTA      Name:     MUSA PRESCOTT   MRN:      [id number]        Account:      [id number]   :      1975           Service Date: 10/23/2017      Document: [id number]

## 2017-10-23 NOTE — PROGRESS NOTES
2017      Daniella Ornelas DO   [facility]    [address] [place] [address]      Latrice Felton MD   [facility]    [address]   [place] [address]      Chriss Jerez MD   [facility]    [address]   [place] [address]      RE: Yaron Blair    MRN: [id number]   : 1975      Dear Doctors:      I had the pleasure to see Mr. Blair in followup today at the [facility].  He is a very pleasant 42-year-old man with a history of random muscle pains or discomfort in multiple areas including shoulders, calves, thighs or jaw, occasionally associated with fasciculations.  His workup disclosed a positive VERNON of 1:640 last year, negative more specific connective tissue disease antibodies, normal complement levels and weakly positive voltage gated potassium channel antibody (41, indeterminate), but negative CASPR2 antibodies.  EMG did not show evidence of peripheral nerve hyperexcitability, myopathy or motor neuron disease.  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  Workup for neuropathy causes was unrevealing.  He is on amitriptyline currently 50 mg daily and Tegretol 400 mg b.i.d.  He has not experienced any improvement unfortunately. and he continues to notice worsening of his symptoms with more muscle stiffness and pain during the day, including thighs, calves and upper extremities and a feeling of weakness, especially when he ascends stairs.  He has some difficulty clearing his throat sometimes but otherwise no new neurological symptoms.      CURRENT MEDICATIONS:   Reviewed and are as per Epic record.      PHYSICAL EXAMINATION:     VITAL SIGNS:   His blood pressure is 151/85.  Pulse 86 and regular.  His weight is 115.2 kg.  Height is 181.   NEUROLOGIC EXAMINATION:  He is awake, alert, oriented x3.  His motor exam shows absolutely normal muscle strength, tone and bulk  throughout.  Reflexes are 1+ at bilateral ankles and knees.  Biceps and triceps are low amplitude and symmetric.  Vibration is asymmetric and it remains 2 seconds at the right great toe and almost 8-9 on the left.  Vibration is detected for a good 8-9 seconds at the right medial malleolus and 11 at the left, 15 seconds at the right index finger and 22 on the left.  Position sensation is normal.  Pinprick and light touch examinations were not done.     In summary, Mr. Prescott has multifocal muscle discomfort or twitching and a mild distal small fiber neuropathy.  It is possible there is an underlying autoimmune cause given the mildly positive VERNON and VGKC antibodies.  He has failed treatment with amitriptyline 50 mg daily and Tegretol 400 mg b.i.d.  I will offer him prednisone 30 mg daily for 1 month and he should return at that time for followup.  If he improves the prednisone dose will be tapered slowly.  If he does not improve then we will manage him symptomatically. Thank you for allowing me to participate in his care.      Sincerely,       Aquilino Rosenthal MD      Total time spent for patient care 15 minutes, more than half was counseling.         AQUILINO ROSENTHAL MD             D: 10/23/2017 15:50   T: 10/23/2017 16:07   MT: GAVIOTA      Name:     MUSA PRESCOTT   MRN:      [id number]        Account:      [id number]   :      1975           Service Date: 10/23/2017      Document: [id number]

## 2017-11-18 DIAGNOSIS — R25.3 BENIGN FASCICULATION-CRAMP SYNDROME: ICD-10-CM

## 2017-11-20 RX ORDER — PREDNISONE 10 MG/1
TABLET ORAL
Qty: 90 TABLET | Refills: 0 | Status: SHIPPED | OUTPATIENT
Start: 2017-11-20 | End: 2017-12-10

## 2017-11-20 NOTE — TELEPHONE ENCOUNTER
Patient last seen 10- by Dr. Webster.  Plan was for prednisone 30 mg daily for 1 month and he should return at that time for followup.  If he improves the prednisone dose will be tapered slowly.  If he does not improve then we will manage him symptomatically.  Patient has future appointment with Dr. Webster on 12/26/2017.  Will forward for recommendations regarding refill request.    Peyton Ng RN

## 2017-12-06 ENCOUNTER — OFFICE VISIT (OUTPATIENT)
Dept: NEUROLOGY | Facility: CLINIC | Age: 42
End: 2017-12-06

## 2017-12-06 VITALS
BODY MASS INDEX: 34.4 KG/M2 | HEART RATE: 82 BPM | SYSTOLIC BLOOD PRESSURE: 127 MMHG | WEIGHT: 254 LBS | HEIGHT: 72 IN | OXYGEN SATURATION: 95 % | DIASTOLIC BLOOD PRESSURE: 89 MMHG

## 2017-12-06 DIAGNOSIS — M79.10 MYALGIA: Primary | ICD-10-CM

## 2017-12-06 DIAGNOSIS — M79.10 MYALGIA: ICD-10-CM

## 2017-12-06 DIAGNOSIS — G62.9 SMALL FIBER NEUROPATHY: ICD-10-CM

## 2017-12-06 DIAGNOSIS — R25.3 BENIGN FASCICULATION-CRAMP SYNDROME: ICD-10-CM

## 2017-12-06 LAB
CK SERPL-CCNC: 103 U/L (ref 30–300)
DEPRECATED CALCIDIOL+CALCIFEROL SERPL-MC: 24 UG/L (ref 20–75)

## 2017-12-06 ASSESSMENT — PAIN SCALES - GENERAL: PAINLEVEL: MILD PAIN (2)

## 2017-12-06 NOTE — NURSING NOTE
Chief Complaint   Patient presents with     RECHECK     [facility] RETURN - NEUROPATHY     Katya Jain MA

## 2017-12-06 NOTE — PROGRESS NOTES
2017      Daniella Ornelas DO   [facility]    [address] [place] [address]      RE: Yaron Blair    MRN: [id number]    : 1975      Dear Dr. Ornelas:      I had the pleasure to see Mr. Blair in followup at the [facility].  He is a pleasant 42-year-old man with a history of random muscle pains or discomfort, including stiffness or tightness in multiple locations. Those areas include shoulder, calf, thighs or jaw.  They were occasionally associated with fasciculations.  His workup disclosed a positive VERNON of 1:640, negative more specific connective tissue disease antibodies, normal complement levels and weakly positive voltage gated potassium channel antibody (41, indeterminate), but negative CASPR2 antibodies.  EMG done at [facility] in 2017 showed no evidence of myopathy, motor neuron disease or peripheral neuropathy or hyperexcitability.  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  Workup for causes of neuropathy was unrevealing other than the VERNON. He is currently on amitriptyline 50 mg daily, and Tegretol 400 mg b.i.d.   A month ago I put him on prednisone 30 mg daily.  His muscle pain, stiffness and tightness have improved by about 50%-60%.  He continues to have this lingering fatigue and subjective muscle weakness, which we were previously unable to confirm on bedside examination.  He has no side effects of steroids- specifically no weight gain or disturbance of his sleep.  He is not on calcium or vitamin D.      CURRENT MEDICATIONS:   Reviewed and are as per Epic record.      PHYSICAL EXAMINATION:     VITAL SIGNS:   His blood pressure is 127/89.  Pulse 82 and regular.  O2 sat 95% on room air.  Weight 115.2 kilos.  Height is 181.  He endorses rather mild pain, 2/10.   NEUROLOGIC:  He has no weakness of neck flexors or extensors, and normal orbicularis oculi, ois, smile and tongue strength.  Normal strength  in deltoid, biceps, triceps, wrist extensors, FDI, APB, handgrip, finger extensors, hip flexion, knee extension, knee flexion and foot dorsiflexors bilaterally.  I did not repeat a sensory or reflex exam.  He has no percussion myotonia.        In summary, Mr. 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  He reports a modest improvement of his symptoms on prednisone 30 mg daily.  Because the improvement is 50%-60% subjectively, I would like to continue it, but gradually taper the dose.  I asked him to take 20 mg daily x1 month, then 15 mg daily and call me in 2 months.  If he is doing well, I will continue him on the lower dose.  If not, we may have to rethink the plan.      His excessive fatigue may be a side effect of amitriptyline or Tegretol.  Because he believes the former medication helps his sleep, I will keep him on the same dose, but I will taper his Tegretol by 1 tablet every week until he is completely off it.  He understands that long-term prednisone use is associated with side effects including cataracts, glaucoma, osteoporosis, risk of infection, mood disturbance, diabetes, etc.  I asked him to take calcium and vitamin D.  I will check his vitamin D level today and tell him how much he should take after I get the result.  I will also check a CK level.  He saw his rheumatologist recently and she wondered whether a muscle biopsy would be helpful.  I told him that in patients who have normal or minimally elevated CK, no myopathic changes on EMG, and no muscle weakness on neuro exam, the yield of biopsies is very low.  I would, however, like to repeat his EMG to take another look and see if there are any signs of membrane irritability or peripheral nerve hyperexcitability.  Those were not present in the original study done in [facility] 11 months ago.      Thank you for  allowing me to participate in the care of Mr. Prescott.  I will see him in followup in 3 months.  At that time, if we decide to continue him on the prednisone, we will have to obtain some labs include hemoglobin A1c and electrolytes at least.  TT spent for patient care 15 minutes; more than half was counseling.         Sincerely,       MD FLAQUITA García MD             D: 2017 10:07   T: 2017 10:31   MT: GAVIOTA      Name:     MUSA PRESCOTT   MRN:      [id number]        Account:      [id number]   :      1975           Service Date: 2017      Document: [id number]

## 2017-12-06 NOTE — MR AVS SNAPSHOT
After Visit Summary   12/6/2017    Yaron Blair    MRN: [id number]           Patient Information     Date Of Birth          1975        Visit Information        Provider Department      12/6/2017 9:50 AM Aquilino Webster MD [facility]        Today's Diagnoses     Myalgia    -  1      Care Instructions    Reduce your prednisone to 20 mg (two tablets) daily for 1 month, then one and a half tablets daily thereafter.  Reduce your Tegretol by one pill every week, until you stop it.  Continue the same dose of amitryptiline.  I will get an EMG to study you muscles again. Please schedule this before you leave.  Labs today (1st Floor)- vitamin D levels, muscle enzymes  Return to Clinic in 3 months. Please call  or send [facility] message with any questions.     GM            Follow-ups after your visit        Follow-up notes from your care team     Return in about 3 months (around 3/6/2018).      Your next 10 appointments already scheduled     Dec 06, 2017 10:30 AM CST   LAB with  LAB   [facility] ([facility])    [address] [place] [id number] [phone]           Please do not eat 10-12 hours before your appointment if you are coming in fasting for labs on lipids, cholesterol, or glucose (sugar). This does not apply to pregnant women. Water, hot tea and black coffee (with nothing added) are okay. Do not drink other fluids, diet soda or chew gum.            Jan 09, 2018  3:15 PM CST   (Arrive by 3:00 PM)   EMG with Aquilino Webster MD   [facility] ([facility])    [address] [place] [address] [phone]           Do not use lotions or creams on the area to be tested. If you are on blood thinners (Warfarin, Coumadin, Lovenox, etc), please contact your primary care physician to check if it is safe to stop them 3 days prior to testing. If you  have anxiety, please check with your referring physician to obtain anti-anxiety medication for the procedure.            Mar 07, 2018  7:50 AM CST   (Arrive by 7:35 AM)   Return Neuropathy Visit with Aquilino Webster MD   [facility] ([facility])    [address] [id number] [phone]              Future tests that were ordered for you today     Open Future Orders        Priority Expected Expires Ordered    EMG Routine  12/6/2018 12/6/2017    CK total Routine  12/6/2018 12/6/2017    Vitamin D Deficiency Screening Routine  12/6/2018 12/6/2017            Who to contact     Please call your clinic at [phone] to:    Ask questions about your health    Make or cancel appointments    Discuss your medicines    Learn about your test results    Speak to your doctor   If you have compliments or concerns about an experience at your clinic, or if you wish to file a complaint, please contact [facility] Patient Relations at [phone] or email us at [email or web address]         Additional Information About Your Visit        2Uhart Information     [employer] gives you secure access to your electronic health record. If you see a primary care provider, you can also send messages to your care team and make appointments. If you have questions, please call your primary care clinic.  If you do not have a primary care provider, please call [phone] and they will assist you.      [employer] is an electronic gateway that provides easy, online access to your medical records. With [employer], you can request a clinic appointment, read your test results, renew a prescription or communicate with your care team.     To access your existing account, please contact your [facility] or call [phone] for assistance.        Care EveryWhere ID     This is your Care EveryWhere ID. This could be  "used by other organizations to access your [place] medical records  [id number]        Your Vitals Were     Pulse Height Pulse Oximetry BMI (Body Mass Index)          82 1.816 m (5' 11.5\") 95% 34.93 kg/m2         Blood Pressure from Last 3 Encounters:   12/06/17 127/89   10/23/17 151/85   07/03/17 140/85    Weight from Last 3 Encounters:   12/06/17 115.2 kg (254 lb)   10/23/17 115.2 kg (254 lb)   03/27/17 113.9 kg (251 lb)               Primary Care Provider Office Phone # Fax #    Daniella Ornelas [phone]       [address] [address]        Equal Access to Services     CLARI ROJAS : Hadii jennie gomez hadasho Soomaali, waaxda luqadaha, qaybta kaalmada adeegyada, odette valencia . So [facility] [phone].    ATENCIÓN: Si habla español, tiene a gomez disposición servicios gratuitos de asistencia lingüística. Llame al [phone].    We comply with applicable federal civil rights laws and [place] laws. We do not discriminate on the basis of race, color, national origin, age, disability, sex, sexual orientation, or gender identity.            Thank you!     Thank you for choosing [facility] NEUROLOGY  for your care. Our goal is always to provide you with excellent care. Hearing back from our patients is one way we can continue to improve our services. Please take a few minutes to complete the written survey that you may receive in the mail after your visit with us. Thank you!             Your Updated Medication List - Protect others around you: Learn how to safely use, store and throw away your medicines at www.disposemymeds.org.          This list is accurate as of: 12/6/17  9:54 AM.  Always use your most recent med list.                   Brand Name Dispense Instructions for use Diagnosis    amitriptyline 10 MG tablet    ELAVIL     Take 3 tablets by mouth daily        carBAMazepine 100 MG 12 hr tablet    TEGretol XR     Take 2 tablets by mouth 2 " times daily        FLONASE 50 MCG/ACT spray   Generic drug:  fluticasone      Spray 1 puff in nostril as needed        omeprazole 20 MG CR capsule    priLOSEC     Take 1 capsule by mouth daily        predniSONE 10 MG tablet    DELTASONE    90 tablet    TAKE 3 TABLETS(30 MG) BY MOUTH DAILY    Benign fasciculation-cramp syndrome (H)

## 2017-12-06 NOTE — PATIENT INSTRUCTIONS
Reduce your prednisone to 20 mg (two tablets) daily for 1 month, then one and a half tablets daily thereafter.  Reduce your Tegretol by one pill every week, until you stop it.  Continue the same dose of amitryptiline.  I will get an EMG to study you muscles again. Please schedule this before you leave.  Labs today (1st Floor)- vitamin D levels, muscle enzymes  Return to Clinic in 3 months. Please call  or send Reacci[employer]³n message with any questions.     CARLI

## 2017-12-06 NOTE — LETTER
2017       RE: Yaron Blair  [address]  [facility] [address]     Dear Colleague,    Thank you for referring your patient, Yaron Blair, to the [facility] NEUROLOGY at [facility]. Please see a copy of my visit note below.    2017      RE: Yaron Blair    MRN: [id number]    : 1975      Dear Dr. Ornelas:      I had the pleasure to see Mr. Blair in followup at the [facility].  He is a pleasant 42-year-old man with a history of random muscle pains or discomfort, including stiffness or tightness in multiple locations. Those areas include shoulder, calf, thighs or jaw.  They were occasionally associated with fasciculations.  His workup disclosed a positive VERNON of 1:640, negative more specific connective tissue disease antibodies, normal complement levels and weakly positive voltage gated potassium channel antibody (41, indeterminate), but negative CASPR2 antibodies.  EMG done at [facility] in 2017 showed no evidence of myopathy, motor neuron disease or peripheral neuropathy or hyperexcitability.  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  Workup for causes of neuropathy was unrevealing other than the VERNON. He is currently on amitriptyline 50 mg daily, and Tegretol 400 mg b.i.d.   A month ago I put him on prednisone 30 mg daily.  His muscle pain, stiffness and tightness have improved by about 50%-60%.  He continues to have this lingering fatigue and subjective muscle weakness, which we were previously unable to confirm on bedside examination.  He has no side effects of steroids- specifically no weight gain or disturbance of his sleep.  He is not on calcium or vitamin D.      CURRENT MEDICATIONS:   Reviewed and are as per Epic record.      PHYSICAL EXAMINATION:     VITAL SIGNS:   His blood pressure is 127/89.  Pulse 82 and regular.  O2 sat 95% on room air.  Weight 115.2 kilos.  Height is  181.  He endorses rather mild pain, 2/10.   NEUROLOGIC:  He has no weakness of neck flexors or extensors, and normal orbicularis oculi, ois, smile and tongue strength.  Normal strength in deltoid, biceps, triceps, wrist extensors, FDI, APB, handgrip, finger extensors, hip flexion, knee extension, knee flexion and foot dorsiflexors bilaterally.  I did not repeat a sensory or reflex exam.  He has no percussion myotonia.        In summary, Mr. 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  He reports a modest improvement of his symptoms on prednisone 30 mg daily.  Because the improvement is 50%-60% subjectively, I would like to continue it, but gradually taper the dose.  I asked him to take 20 mg daily x1 month, then 15 mg daily and call me in 2 months.  If he is doing well, I will continue him on the lower dose.  If not, we may have to rethink the plan.      His excessive fatigue may be a side effect of amitriptyline or Tegretol.  Because he believes the former medication helps his sleep, I will keep him on the same dose, but I will taper his Tegretol by 1 tablet every week until he is completely off it.  He understands that long-term prednisone use is associated with side effects including cataracts, glaucoma, osteoporosis, risk of infection, mood disturbance, diabetes, etc.  I asked him to take calcium and vitamin D.  I will check his vitamin D level today and tell him how much he should take after I get the result.  I will also check a CK level.  He saw his rheumatologist recently and she wondered whether a muscle biopsy would be helpful.  I told him that in patients who have normal or minimally elevated CK, no myopathic changes on EMG, and no muscle weakness on neuro exam, the yield of biopsies is very low.  I would, however, like to repeat his EMG to take another look and see if  there are any signs of membrane irritability or peripheral nerve hyperexcitability.  Those were not present in the original study done in [facility] 11 months ago.      Thank you for allowing me to participate in the care of Mr. Prescott.  I will see him in followup in 3 months.  At that time, if we decide to continue him on the prednisone, we will have to obtain some labs include hemoglobin A1c and electrolytes at least.  TT spent for patient care 15 minutes; more than half was counseling.         AQUILINO ROSENTHAL MD             D: 2017 10:07   T: 2017 10:31   MT: GAVIOTA      Name:     MUSA PRESCOTT   MRN:      [id number]        Account:      [id number]   :      1975           Service Date: 2017      Document: [id number]        Again, thank you for allowing me to participate in the care of your patient.      Sincerely,    Aquilino Rosenthal MD    CC:  Daniella Ornelas [facility]    [address] [place] [address] [place] [address]

## 2017-12-10 DIAGNOSIS — R25.3 BENIGN FASCICULATION-CRAMP SYNDROME: ICD-10-CM

## 2017-12-11 RX ORDER — PREDNISONE 10 MG/1
20 TABLET ORAL DAILY
Qty: 120 TABLET | Refills: 0 | Status: SHIPPED | OUTPATIENT
Start: 2017-12-11 | End: 2018-04-08

## 2017-12-11 NOTE — TELEPHONE ENCOUNTER
Received refill request for prednisone [facility]. Per Dr. Webster' last note, patient should take 20 mg QD for 1 month, then take 15 mg QD for 2 months and then follow up with Dr. Webster.   Rx pended to Dr. Webster for review and signature.

## 2018-01-09 ENCOUNTER — OFFICE VISIT (OUTPATIENT)
Dept: NEUROLOGY | Facility: CLINIC | Age: 43
End: 2018-01-09
Payer: COMMERCIAL

## 2018-01-09 DIAGNOSIS — M79.10 MYALGIA: ICD-10-CM

## 2018-01-09 DIAGNOSIS — G62.9 SENSORY NEUROPATHY: Primary | ICD-10-CM

## 2018-01-09 NOTE — LETTER
1/9/2018       RE: Yaron Blair  [address]  [facility] [address]     Dear Colleague,    Thank you for referring your patient, Yaron Blair, to the [facility] EMG at [facility]. Please see a copy of my visit note below.        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 He has multifocal muscle pain, cramps, and fatigue. Previous EMG/NCS done in [facility] a year ago were normal. 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 He has a positive VERNON titer of 1:640 and modestly elevated voltage gated potassium channel antibodies. He noticed a moderate improvement of his symptoms on prednisone 20-30 mg daily. Repeat EMG requested to re-evaluate for myopathy or peripheral nerve hyperexcitability signs.     Techniques: Motor and sensory conduction studies were done with surface recording electrodes. Temperature was monitored and recorded throughout the study. Upper extremities were maintained at a temperature of 32 degrees Centigrade or higher.  Lower extremities were maintained at a temperature of 31degrees Centigrade or higher. EMG was done with a concentric needle electrode.     Results:    Left median, ulnar, and right superficial peroneal antidromic sensory NCSs were normal. Right sural antidromic sensory NCS showed mildly attenuated SNAP amplitude and normal conduction velocity. Left median, ulnar, right deep peroneal, and right tibial motor NCSs were normal. Right tibial and left median F-wave latencies were normal. Repetitive nerve stimulation of the right tibial nerve at the ankle at 10 Hz showed no after-discharges and no abnormal CMAP decrement.  EMG of selected muscles at the left upper and right lower extremities was normal as tabulated.    Interpretation:    Mild length-dependent sensory neuropathy. This study is different from the previous one in that the sural sensory response is now attenuated (previously normal). Otherwise, the study is entirely unchanged from prior, and shows no electrodiagnostic evidence of myopathy, polyradiculopathy, or motor peripheral nerve hyperexcitability.     EMG Physician:    Aquilino Webster MD              Sensory NCS      Nerve / Sites Rec. Site Onset Peak NP Amp Ref. PP Amp Dist Enmanuel Ref. Temp     ms ms  V  V  V cm m/s m/s  C   L MEDIAN - Dig II Anti      Wrist Dig II 2.45 3.23 26.9 10.0 44.6 14 57.2 48.0 32.6   L ULNAR - Dig V Anti      Wrist Dig V 2.66 3.44 25.1 8.0 39.4 12.5 47.1 48.0 32.7   R SURAL - Lat Mall      Calf Ankle 2.76 3.80 5.3 8.0 7.5 14 50.7 38.0 30.2   R SUP PERONEAL      Lat Leg Acevedo 2.92 4.11 8.5  3.8 12.5 42.9 38.0 29.8       Motor NCS      Nerve / Sites Rec. Site Lat Ref. Amp Ref. Rel Amp Dist Enmanuel Ref. Dur. Area Temp.     ms ms mV mV % cm m/s m/s ms %  C   L MEDIAN - APB      Wrist APB 3.02 4.40 16.6 5.0 100 8   6.51 100 33      Elbow APB 7.60  16.3  97.9 24 52.4 48.0 6.56 93.5 33   L ULNAR - ADM      Wrist ADM 2.76 3.50 9.0 5.0 100 8   6.93 100 32.6      B.Elbow ADM 6.30  9.0  100 21 59.3 48.0 6.88 96.8 32.6      A.Elbow ADM 8.39  8.6  96.1 11 52.8 48.0 6.93 88.3 32.6   R DEEP PERONEAL - EDB      Ankle EDB 5.16 6.00 7.4 2.5 100 8   5.47 100 30.1      FibHead EDB 12.55  4.5  60.7 30.5 41.2 38.0 7.19 87.3 30.2      Pop Fos EDB 14.53  4.2  56.9 9 45.5 38.0 7.14 83.4 30.2   R TIBIAL - AH      Ankle AH 4.84 6.00 13.8 4.0 100 8   5.05 100 30.3      Pop Fos AH 15.57  8.5  61.5 46 42.9 38.0 6.72 80 30.3       F  Wave      Nerve Min F Lat Max F Lat Mean FLat Temp.    ms ms ms  C   L MEDIAN 28.18 29.01 28.62 33.1   R TIBIAL 54.37 57.66 56.16 30.2       Rep Nerve Stim 10      Muscle / Train Time Rate Amp  4-1 10-1 Fac Ampl Area 4-1 10-1 Fac Area     pps mV % % % mVms % % %   R EXT DIG BREVIS - Peroneal   Baseline 0:00:00 10 14.1 12.4 14.6 100 26.5 -17 -22.3 100       EMG Summary Table     Spontaneous MUAP Recruitment    IA Fib Fasc H.F. Amp Dur. PPP Pattern   R. TIB ANTERIOR N None None None N N None Normal   R. GASTROCN (MED) N None None None N N None Normal   R. VAST LATERALIS N None None None N N None Normal   R. BIC FEM (S HEAD) N None None None N N None Normal   R. GLUTEUS MED N None None None N N None Normal   L. FIRST D INTEROSS N None None None N N None Normal   L. TRICEPS N None None None N N None Normal   L. DELTOID N None None None N N None Normal   L. BICEPS N None None None N N None Normal   L. PRON TERES N None None None N N None Normal                                        Again, thank you for allowing me to participate in the care of your patient.      Sincerely,    Aquilino Webster MD

## 2018-01-09 NOTE — MR AVS SNAPSHOT
After Visit Summary   1/9/2018    Yaron Blair    MRN: [id number]           Patient Information     Date Of Birth          1975        Visit Information        Provider Department      1/9/2018 3:15 PM Aquilino Webster MD M [facility] EMG        Today's Diagnoses     Sensory neuropathy    -  1    Myalgia           Follow-ups after your visit        Your next 10 appointments already scheduled     Mar 14, 2018  7:50 AM CDT   (Arrive by 7:35 AM)   Return Neuropathy Visit with MD JOSE Hedrick [facility] Neurology ([facility] [place])    [address] [place] [id number] [phone]              Future tests that were ordered for you today     Open Future Orders        Priority Expected Expires Ordered    Needle EMG Each Extremity w/Paraspinal Area Complete (04179) Routine  1/9/2019 1/9/2018            Who to contact     Please call your clinic at [phone] to:    Ask questions about your health    Make or cancel appointments    Discuss your medicines    Learn about your test results    Speak to your doctor   If you have compliments or concerns about an experience at your clinic, or if you wish to file a complaint, please contact [facility] Patient Relations at [phone] or email us at [email or web address]         Additional Information About Your Visit        Geoshohart Information     SureVisit gives you secure access to your electronic health record. If you see a primary care provider, you can also send messages to your care team and make appointments. If you have questions, please call your primary care clinic.  If you do not have a primary care provider, please call [phone] and they will assist you.      SureVisit is an electronic gateway that provides easy, online access to your medical records. With SureVisit, you can request a clinic appointment, read your test results, renew a  prescription or communicate with your care team.     To access your existing account, please contact your [facility] or call [phone] for assistance.        Care EveryWhere ID     This is your Care EveryWhere ID. This could be used by other organizations to access your [place] medical records  [id number]         Blood Pressure from Last 3 Encounters:   12/06/17 127/89   10/23/17 151/85   07/03/17 140/85    Weight from Last 3 Encounters:   12/06/17 115.2 kg (254 lb)   10/23/17 115.2 kg (254 lb)   03/27/17 113.9 kg (251 lb)              We Performed the Following     EMG     HC NCS MOTOR W OR W/O F-WAVE, 7 OR 8     Neuromuscular Junction Test, Each Nerve (73877)        Primary Care Provider Office Phone # Fax #    Daniella Ornelas [phone]       [facility] [address] [place] [id number]        Equal Access to Services     CLARI ROJAS : Hadii aad ku hadasho Soomaali, waaxda luqadaha, qaybta kaalmada adeegyada, waxay idiin hayaan kelvineg sebastianarabilly valencia . So [facility] [phone].    ATENCIÓN: Si habla español, tiene a gomez disposición servicios gratuitos de asistencia lingüística. Llame al [phone].    We comply with applicable federal civil rights laws and [place] laws. We do not discriminate on the basis of race, color, national origin, age, disability, sex, sexual orientation, or gender identity.            Thank you!     Thank you for choosing [facility]  for your care. Our goal is always to provide you with excellent care. Hearing back from our patients is one way we can continue to improve our services. Please take a few minutes to complete the written survey that you may receive in the mail after your visit with us. Thank you!             Your Updated Medication List - Protect others around you: Learn how to safely use, store and throw away your medicines at www.disposemymeds.org.          This list is accurate as of: 1/9/18  3:35 PM.  Always  use your most recent med list.                   Brand Name Dispense Instructions for use Diagnosis    amitriptyline 10 MG tablet    ELAVIL     Take 3 tablets by mouth daily        carBAMazepine 100 MG 12 hr tablet    TEGretol XR     Take 2 tablets by mouth 2 times daily        FLONASE 50 MCG/ACT spray   Generic drug:  fluticasone      Spray 1 puff in nostril as needed        omeprazole 20 MG CR capsule    priLOSEC     Take 1 capsule by mouth daily        predniSONE 10 MG tablet    DELTASONE    120 tablet    Take 2 tablets (20 mg) by mouth daily    Benign fasciculation-cramp syndrome (H)

## 2018-01-09 NOTE — PROGRESS NOTES
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 He has multifocal muscle pain, cramps, and fatigue. Previous EMG/NCS done in [facility] a year ago were normal. 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 He has a positive VERNON titer of 1:640 and modestly elevated voltage gated potassium channel antibodies. He noticed a moderate improvement of his symptoms on prednisone 20-30 mg daily. Repeat EMG requested to re-evaluate for myopathy or peripheral nerve hyperexcitability signs.     Techniques: Motor and sensory conduction studies were done with surface recording electrodes. Temperature was monitored and recorded throughout the study. Upper extremities were maintained at a temperature of 32 degrees Centigrade or higher.  Lower extremities were maintained at a temperature of 31degrees Centigrade or higher. EMG was done with a concentric needle electrode.     Results:    Left median, ulnar, and right superficial peroneal antidromic sensory NCSs were normal. Right sural antidromic sensory NCS showed mildly attenuated SNAP amplitude and normal conduction velocity. Left median, ulnar, right deep peroneal, and right tibial motor NCSs were normal. Right tibial and left median F-wave latencies were normal. Repetitive nerve stimulation of the right tibial nerve at the ankle at 10 Hz showed no after-discharges and no abnormal CMAP decrement. EMG of selected muscles at the left upper and right lower extremities was normal as tabulated.    Interpretation:    Mild length-dependent sensory neuropathy. This study is different from the previous one in that the sural sensory response is now attenuated (previously  normal). Otherwise, the study is entirely unchanged from prior, and shows no electrodiagnostic evidence of myopathy, polyradiculopathy, or motor peripheral nerve hyperexcitability.     EMG Physician:    Aquilino Webster MD              Sensory NCS      Nerve / Sites Rec. Site Onset Peak NP Amp Ref. PP Amp Dist Enmanuel Ref. Temp     ms ms  V  V  V cm m/s m/s  C   L MEDIAN - Dig II Anti      Wrist Dig II 2.45 3.23 26.9 10.0 44.6 14 57.2 48.0 32.6   L ULNAR - Dig V Anti      Wrist Dig V 2.66 3.44 25.1 8.0 39.4 12.5 47.1 48.0 32.7   R SURAL - Lat Mall      Calf Ankle 2.76 3.80 5.3 8.0 7.5 14 50.7 38.0 30.2   R SUP PERONEAL      Lat Leg Acevedo 2.92 4.11 8.5  3.8 12.5 42.9 38.0 29.8       Motor NCS      Nerve / Sites Rec. Site Lat Ref. Amp Ref. Rel Amp Dist Enmanuel Ref. Dur. Area Temp.     ms ms mV mV % cm m/s m/s ms %  C   L MEDIAN - APB      Wrist APB 3.02 4.40 16.6 5.0 100 8   6.51 100 33      Elbow APB 7.60  16.3  97.9 24 52.4 48.0 6.56 93.5 33   L ULNAR - ADM      Wrist ADM 2.76 3.50 9.0 5.0 100 8   6.93 100 32.6      B.Elbow ADM 6.30  9.0  100 21 59.3 48.0 6.88 96.8 32.6      A.Elbow ADM 8.39  8.6  96.1 11 52.8 48.0 6.93 88.3 32.6   R DEEP PERONEAL - EDB      Ankle EDB 5.16 6.00 7.4 2.5 100 8   5.47 100 30.1      FibHead EDB 12.55  4.5  60.7 30.5 41.2 38.0 7.19 87.3 30.2      Pop Fos EDB 14.53  4.2  56.9 9 45.5 38.0 7.14 83.4 30.2   R TIBIAL - AH      Ankle AH 4.84 6.00 13.8 4.0 100 8   5.05 100 30.3      Pop Fos AH 15.57  8.5  61.5 46 42.9 38.0 6.72 80 30.3       F  Wave      Nerve Min F Lat Max F Lat Mean FLat Temp.    ms ms ms  C   L MEDIAN 28.18 29.01 28.62 33.1   R TIBIAL 54.37 57.66 56.16 30.2       Rep Nerve Stim 10      Muscle / Train Time Rate Amp 4-1 10-1 Fac Ampl Area 4-1 10-1 Fac Area     pps mV % % % mVms % % %   R EXT DIG BREVIS - Peroneal   Baseline 0:00:00 10 14.1 12.4 14.6 100 26.5 -17 -22.3 100       EMG Summary Table     Spontaneous MUAP Recruitment    IA Fib Fasc H.F. Amp Dur. PPP Pattern   R. TIB  ANTERIOR N None None None N N None Normal   R. GASTROCN (MED) N None None None N N None Normal   R. VAST LATERALIS N None None None N N None Normal   R. BIC FEM (S HEAD) N None None None N N None Normal   R. GLUTEUS MED N None None None N N None Normal   L. FIRST D INTEROSS N None None None N N None Normal   L. TRICEPS N None None None N N None Normal   L. DELTOID N None None None N N None Normal   L. BICEPS N None None None N N None Normal   L. PRON TERES N None None None N N None Normal

## 2018-03-14 ENCOUNTER — OFFICE VISIT (OUTPATIENT)
Dept: NEUROLOGY | Facility: CLINIC | Age: 43
End: 2018-03-14
Payer: COMMERCIAL

## 2018-03-14 VITALS
BODY MASS INDEX: 35.24 KG/M2 | DIASTOLIC BLOOD PRESSURE: 85 MMHG | HEIGHT: 72 IN | SYSTOLIC BLOOD PRESSURE: 136 MMHG | HEART RATE: 91 BPM | WEIGHT: 260.2 LBS

## 2018-03-14 DIAGNOSIS — Z79.52 CURRENT CHRONIC USE OF SYSTEMIC STEROIDS: ICD-10-CM

## 2018-03-14 DIAGNOSIS — M79.10 MYALGIA: Primary | ICD-10-CM

## 2018-03-14 DIAGNOSIS — R53.1 SUBJECTIVE WEAKNESS: ICD-10-CM

## 2018-03-14 DIAGNOSIS — M79.10 MYALGIA: ICD-10-CM

## 2018-03-14 DIAGNOSIS — G62.9 SENSORY NEUROPATHY: ICD-10-CM

## 2018-03-14 DIAGNOSIS — R25.3 BENIGN FASCICULATIONS: ICD-10-CM

## 2018-03-14 LAB
ALBUMIN SERPL-MCNC: 4.2 G/DL (ref 3.4–5)
ALP SERPL-CCNC: 56 U/L (ref 40–150)
ALT SERPL W P-5'-P-CCNC: 33 U/L (ref 0–70)
ANION GAP SERPL CALCULATED.3IONS-SCNC: 6 MMOL/L (ref 3–14)
AST SERPL W P-5'-P-CCNC: 16 U/L (ref 0–45)
BILIRUB SERPL-MCNC: 0.4 MG/DL (ref 0.2–1.3)
BUN SERPL-MCNC: 17 MG/DL (ref 7–30)
CALCIUM SERPL-MCNC: 9.1 MG/DL (ref 8.5–10.1)
CHLORIDE SERPL-SCNC: 102 MMOL/L (ref 94–109)
CO2 SERPL-SCNC: 29 MMOL/L (ref 20–32)
CREAT SERPL-MCNC: 0.98 MG/DL (ref 0.66–1.25)
DEPRECATED CALCIDIOL+CALCIFEROL SERPL-MC: 25 UG/L (ref 20–75)
GFR SERPL CREATININE-BSD FRML MDRD: 83 ML/MIN/1.7M2
GLUCOSE SERPL-MCNC: 107 MG/DL (ref 70–99)
HBA1C MFR BLD: 5.7 % (ref 4.3–6)
POTASSIUM SERPL-SCNC: 4.1 MMOL/L (ref 3.4–5.3)
PROT SERPL-MCNC: 7.5 G/DL (ref 6.8–8.8)
SODIUM SERPL-SCNC: 137 MMOL/L (ref 133–144)

## 2018-03-14 ASSESSMENT — ENCOUNTER SYMPTOMS
PARALYSIS: 0
COUGH DISTURBING SLEEP: 0
ALTERED TEMPERATURE REGULATION: 1
SHORTNESS OF BREATH: 1
LOSS OF CONSCIOUSNESS: 0
DECREASED APPETITE: 0
COUGH: 0
NIGHT SWEATS: 0
WEIGHT GAIN: 0
FATIGUE: 1
DIZZINESS: 0
SPEECH CHANGE: 0
MUSCLE CRAMPS: 1
ARTHRALGIAS: 0
BACK PAIN: 0
FEVER: 0
TREMORS: 0
INCREASED ENERGY: 0
NUMBNESS: 0
SPUTUM PRODUCTION: 0
MEMORY LOSS: 0
WEAKNESS: 1
POSTURAL DYSPNEA: 0
WEIGHT LOSS: 0
CHILLS: 0
HALLUCINATIONS: 0
JOINT SWELLING: 0
MYALGIAS: 1
MUSCLE WEAKNESS: 1
STIFFNESS: 0
NECK PAIN: 0
DISTURBANCES IN COORDINATION: 0
WHEEZING: 0
HEADACHES: 1
TINGLING: 0
POLYDIPSIA: 0
POLYPHAGIA: 0
SEIZURES: 0
DYSPNEA ON EXERTION: 0
HEMOPTYSIS: 0
SNORES LOUDLY: 0

## 2018-03-14 ASSESSMENT — PAIN SCALES - GENERAL: PAINLEVEL: NO PAIN (0)

## 2018-03-14 NOTE — MR AVS SNAPSHOT
After Visit Summary   3/14/2018    Yaron Blair    MRN: [id number]           Patient Information     Date Of Birth          1975        Visit Information        Provider Department      3/14/2018 7:50 AM Aquilino Webster MD [facility] Neurology        Today's Diagnoses     Myalgia    -  1    Subjective weakness        Current chronic use of systemic steroids          Care Instructions    Reduce your prednisone dose to 10 mg daily.  Please get labs today including testing for diabetes, electrolyte problems (those can happen with long term steroid use), and vitamin D levels. If vitamin D is found to be low, will prescribe you a supplement.  I am not appreciating any concerning signs for nerve or muscle disease to explain your arm weakness. I still feel that your arm and leg muscle exam remains normal.   Please return to Clinic in 4-5 months. Call  or send a [employer] message with any questions.            Follow-ups after your visit        Follow-up notes from your care team     Return in about 4 months (around 7/14/2018).      Your next 10 appointments already scheduled     Mar 14, 2018  8:30 AM CDT   LAB with [facility] ([facility])    [address] [place] [id number] [phone]           Please do not eat 10-12 hours before your appointment if you are coming in fasting for labs on lipids, cholesterol, or glucose (sugar). This does not apply to pregnant women. Water, hot tea and black coffee (with nothing added) are okay. Do not drink other fluids, diet soda or chew gum.            Jul 25, 2018  7:50 AM CDT   (Arrive by 7:35 AM)   Return Neuropathy Visit with Aquilino Webster MD   [facility] Neurology ([facility])    [address] [place] [id number] [phone]              Future tests that were ordered for you today     Open Future Orders      "   Priority Expected Expires Ordered    Hemoglobin A1c Routine  3/14/2019 3/14/2018    Comprehensive metabolic panel Routine  3/14/2019 3/14/2018    Vitamin D Deficiency Screening Routine  3/14/2019 3/14/2018            Who to contact     Please call your clinic at [phone] to:    Ask questions about your health    Make or cancel appointments    Discuss your medicines    Learn about your test results    Speak to your doctor            Additional Information About Your Visit        [employer] Information     [employer] gives you secure access to your electronic health record. If you see a primary care provider, you can also send messages to your care team and make appointments. If you have questions, please call your primary care clinic.  If you do not have a primary care provider, please call [phone] and they will assist you.      [employer]iva is an electronic gateway that provides easy, online access to your medical records. With [employer], you can request a clinic appointment, read your test results, renew a prescription or communicate with your care team.     To access your existing account, please contact your [facility] or call [phone] for assistance.        Care EveryWhere ID     This is your Care EveryWhere ID. This could be used by other organizations to access your [facility] medical records  [id number]        Your Vitals Were     Pulse Height BMI (Body Mass Index)             91 1.816 m (5' 11.5\") 35.78 kg/m2          Blood Pressure from Last 3 Encounters:   03/14/18 136/85   12/06/17 127/89   10/23/17 151/85    Weight from Last 3 Encounters:   03/14/18 118 kg (260 lb 3.2 oz)   12/06/17 115.2 kg (254 lb)   10/23/17 115.2 kg (254 lb)               Primary Care Provider Office Phone # Fax #    Daniella DO Ceci [phone]       [facility] [address] [facility] [address]        Equal Access to Services     CLARI ROJAS AH: Ronn sweet " Juan Jose, serina sumnerphongha, andra kajose alberto block, odette kayliein hayaan kelvinrosita sbeastianjoan laEloisagermán kendy. So [facility] [phone].    ATENCIÓN: Si thomas mac, tiene a gomez disposición servicios gratuitos de asistencia lingüística. Germán al [phone].    We comply with applicable federal civil rights laws and [place] laws. We do not discriminate on the basis of race, color, national origin, age, disability, sex, sexual orientation, or gender identity.            Thank you!     Thank you for choosing [facility] NEUROLOGY  for your care. Our goal is always to provide you with excellent care. Hearing back from our patients is one way we can continue to improve our services. Please take a few minutes to complete the written survey that you may receive in the mail after your visit with us. Thank you!             Your Updated Medication List - Protect others around you: Learn how to safely use, store and throw away your medicines at www.disposemymeds.org.          This list is accurate as of 3/14/18  7:54 AM.  Always use your most recent med list.                   Brand Name Dispense Instructions for use Diagnosis    amitriptyline 10 MG tablet    ELAVIL     Take 3 tablets by mouth daily        carBAMazepine 100 MG 12 hr tablet    TEGretol XR     Take 2 tablets by mouth 2 times daily        FLONASE 50 MCG/ACT spray   Generic drug:  fluticasone      Spray 1 puff in nostril as needed        omeprazole 20 MG CR capsule    priLOSEC     Take 1 capsule by mouth daily        predniSONE 10 MG tablet    DELTASONE    120 tablet    Take 2 tablets (20 mg) by mouth daily    Benign fasciculation-cramp syndrome (H)

## 2018-03-14 NOTE — PATIENT INSTRUCTIONS
Reduce your prednisone dose to 10 mg daily.  Please get labs today including testing for diabetes, electrolyte problems (those can happen with long term steroid use), and vitamin D levels. If vitamin D is found to be low, will prescribe you a supplement.  I am not appreciating any concerning signs for nerve or muscle disease to explain your arm weakness. I still feel that your arm and leg muscle exam remains normal.   Please return to Clinic in 4-5 months. Call  or send a [employer] message with any questions.

## 2018-03-14 NOTE — PROGRESS NOTES
Service Date: 2018        Daniella Ornelas DO   [facility]    [address] [place] [address]      RE: Yaron Blair   MRN: [id number]    : 1975           Dear Dr. Ornelas:        I had the pleasure to see Mr. Blair in follow up at the [facility].  He is a pleasant 43-year-old man with a history of random muscle pains or discomfort including stiffness or tightness in his shoulder, calf, thighs or jaw, ocasionally associated with fasciculations, especially in the right calf.  His workup disclosed a positive VERNON at 1:640, but negative more specific connective tissue disease antibodies.  He had an indeterminate  level of positive voltage gated potassium channel antibody but negative Caspr2 antibodies.  EMG done in [facility] in 2017 and by myself in 2018 showed no evidence of myopathy, motor neuron disease or peripheral nerve hyperexcitability.  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  A skin biopsy last year had shown a mild length dependent small fiber neuropathy as well with workup for this neuropathy negative except for the positive VERNON.  He is currently on amitriptyline 30 mg a day and Tegretol intermittently 200 mg b.i.d.  He is on prednisone 15 mg a day.  Prednisone helped partially his muscle pain and stiffness and tightness.  However, today the patient tells me that he feels that he is worse in terms of his arm weakness.  Repetitive activities cause weakness with flexion and extension of the elbow or gripping things.  He does not drop things from his hands.  He denies difficulty lifting his arms overhead or reaching objects from a tall shelf.  He has no head drop, ptosis, diplopia, dysarthria, dysphonia, sialorrhea, difficulty chewing or any other cranial nerve symptoms.  He does not feel that his leg strength has changed.  He believes he has lost  some muscle bulk, especially in the dorsal forearm towards the extensor indicis, and on the dorsum of the hands.  His neuromuscular exams have been repeatedly normal in the past.  Labs checked in 12/2017 including CK and vitamin D levels were normal.  Medications reviewed and are as per Epic record.      On physical examination blood pressure is 136/85.  Pulse 91 and regular.  He weighs 118 kg.  Height is 181.  He endorses no pain.  BMI is 35.78.  I repeated a limited neuromuscular exam.  He has normal neck flexion and extension strength.  He has normal strength of his orbicularis oculi, oris, uvula, jaw, palate and tongue.  There is no tongue atrophy or fasciculations. His strength is 5 out of 5 for deltoids, biceps, triceps, hand , wrist flexion, wrist extension, FDI, APB and finger extension.  There is no focal muscle atrophy or fasciculations that I can appreciate and his muscle bulk is excellent in the arms.  Similarly, he has normal strength in the legs for hip flexion, knee extension, knee flexion, foot dorsiflexion bilaterally.  Ankle reflexes are 2, easily obtainable.  Knee reflexes are 1-2.  Biceps, triceps and brachioradialis reflexes are 1 and symmetric.  There is no sensory deficit in the upper extremities. Abduction of the shoulders for 1-1/2 minutes does not lead to any pathological muscle fatigue such as seen in myasthenia.        In summary, Mr. Blair has subjective complaints about loss of muscle bulk and weakness in his upper extremities but objectively his exam is, one more time, normal.  His most recent needle EMG examination of the right arm was normal in 01/2018 and he has no signs to suggest myasthenia.  I told him that while I do not have a good explanation for this symptom I also do not see any ominous neurological signs to suggest a more serious disorder.  He should taper his prednisone to 10 mg daily.  I will check a hemoglobin A1c level and a metabolic panel to survey for routine  complications of steroid use.  I will also recheck a vitamin D level.  If it is below 30s then one may consider supplementation as sometimes mild vitamin D deficiency can cause subjective weakness. He will return to clinic for followup in 4-5 months or sooner if necessary.        Sincerely,         MD FLAQUITA De Paz MD             D: 2018   T: 2018   MT: yassine      Name:     MUSA PRESCOTT   MRN:      [id number]        Account:      [id number]   :      1975           Service Date: 2018      Document: [id number]

## 2018-03-14 NOTE — LETTER
3/14/2018       RE: Yaron Blair  [address] [place] [address]     Dear Colleague,    Thank you for referring your patient, Yaron Blair, to the [facility] NEUROLOGY at [facility]. Please see a copy of my visit note below.    Service Date: 2018        Daniella Ornelas DO   [facility]    [address] [place] [address]      RE: Yaron Blair   MRN: [id number]    : 1975           Dear Dr. Ornelas:        I had the pleasure to see Mr. Blair in follow up at the [facility].  He is a pleasant 43-year-old man with a history of random muscle pains or discomfort including stiffness or tightness in his shoulder, calf, thighs or jaw, ocasionally associated with fasciculations, especially in the right calf.  His workup disclosed a positive VERNON at 1:640, but negative more specific connective tissue disease antibodies.  He had an indeterminate  level of positive voltage gated potassium channel antibody but negative Caspr2 antibodies.  EMG done in [facility] in 2017 and by myself in 2018 showed no evidence of myopathy, motor neuron disease or peripheral nerve hyperexcitability.  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  A skin biopsy last year had shown a mild length dependent small fiber neuropathy as well with workup for this neuropathy negative except for the positive VERNON.  He is currently on amitriptyline 30 mg a day and Tegretol intermittently 200 mg b.i.d.  He is on prednisone 15 mg a day.  Prednisone helped partially his muscle pain and stiffness and tightness.  However, today the patient tells me that he feels that he is worse in terms of his arm weakness.  Repetitive activities cause weakness with flexion and extension of the elbow or gripping things.  He does not drop things from his hands.  He denies difficulty  lifting his arms overhead or reaching objects from a tall shelf.  He has no head drop, ptosis, diplopia, dysarthria, dysphonia, sialorrhea, difficulty chewing or any other cranial nerve symptoms.  He does not feel that his leg strength has changed.  He believes he has lost some muscle bulk, especially in the dorsal forearm towards the extensor indicis, and on the dorsum of the hands.  His neuromuscular exams have been repeatedly normal in the past.  Labs checked in 12/2017 including CK and vitamin D levels were normal.  Medications reviewed and are as per Epic record.      On physical examination blood pressure is 136/85.  Pulse 91 and regular.  He weighs 118 kg.  Height is 181.  He endorses no pain.  BMI is 35.78.  I repeated a limited neuromuscular exam.  He has normal neck flexion and extension strength.  He has normal strength of his orbicularis oculi, oris, uvula, jaw, palate and tongue.  There is no tongue atrophy or fasciculations. His strength is 5 out of 5 for deltoids, biceps, triceps, hand , wrist flexion, wrist extension, FDI, APB and finger extension.  There is no focal muscle atrophy or fasciculations that I can appreciate and his muscle bulk is excellent in the arms.  Similarly, he has normal strength in the legs for hip flexion, knee extension, knee flexion, foot dorsiflexion bilaterally.  Ankle reflexes are 2, easily obtainable.  Knee reflexes are 1-2.  Biceps, triceps and brachioradialis reflexes are 1 and symmetric.  There is no sensory deficit in the upper extremities. Abduction of the shoulders for 1-1/2 minutes does not lead to any pathological muscle fatigue such as seen in myasthenia.        In summary, Mr. Blair has subjective complaints about loss of muscle bulk and weakness in his upper extremities but objectively his exam is, one more time, normal.  His most recent needle EMG examination of the right arm was normal in 01/2018 and he has no signs to suggest myasthenia.  I told him  that while I do not have a good explanation for this symptom I also do not see any ominous neurological signs to suggest a more serious disorder.  He should taper his prednisone to 10 mg daily.  I will check a hemoglobin A1c level and a metabolic panel to survey for routine complications of steroid use.  I will also recheck a vitamin D level.  If it is below 30s then one may consider supplementation as sometimes mild vitamin D deficiency can cause subjective weakness. He will return to clinic for followup in 4-5 months or sooner if necessary.        Sincerely,         Aquilino Webster MD     D: 2018   T: 2018   MT: yassine      Name:     MUSA PRESCOTT   MRN:      [id number]        Account:      [id number]   :      1975           Service Date: 2018      Document: [id number]

## 2018-04-08 DIAGNOSIS — R25.3 BENIGN FASCICULATION-CRAMP SYNDROME: ICD-10-CM

## 2018-04-09 RX ORDER — PREDNISONE 10 MG/1
10 TABLET ORAL DAILY
Qty: 90 TABLET | Refills: 0 | Status: SHIPPED | OUTPATIENT
Start: 2018-04-09 | End: 2018-06-08

## 2018-06-08 DIAGNOSIS — R25.3 BENIGN FASCICULATION-CRAMP SYNDROME: ICD-10-CM

## 2018-06-11 RX ORDER — PREDNISONE 5 MG/1
15 TABLET ORAL DAILY
Qty: 90 TABLET | Refills: 1 | Status: SHIPPED | OUTPATIENT
Start: 2018-06-11 | End: 2018-08-11

## 2018-08-11 DIAGNOSIS — R25.3 BENIGN FASCICULATION-CRAMP SYNDROME: ICD-10-CM

## 2018-08-13 RX ORDER — PREDNISONE 5 MG/1
15 TABLET ORAL DAILY
Qty: 90 TABLET | Refills: 3 | Status: SHIPPED | OUTPATIENT
Start: 2018-08-13 | End: 2019-01-02

## 2018-08-13 NOTE — TELEPHONE ENCOUNTER
Per patient's [employer] message from 4/8/2018 he increased to 15 mg because 10 was not enough. Will approve 15 mg daily. Thanks

## 2018-08-15 ENCOUNTER — OFFICE VISIT (OUTPATIENT)
Dept: NEUROLOGY | Facility: CLINIC | Age: 43
End: 2018-08-15
Payer: COMMERCIAL

## 2018-08-15 VITALS
HEIGHT: 72 IN | RESPIRATION RATE: 20 BRPM | BODY MASS INDEX: 35.1 KG/M2 | DIASTOLIC BLOOD PRESSURE: 78 MMHG | OXYGEN SATURATION: 96 % | SYSTOLIC BLOOD PRESSURE: 118 MMHG | WEIGHT: 259.1 LBS | HEART RATE: 88 BPM

## 2018-08-15 DIAGNOSIS — R25.2 MUSCLE CRAMPS: ICD-10-CM

## 2018-08-15 DIAGNOSIS — R06.09 DYSPNEA ON EXERTION: ICD-10-CM

## 2018-08-15 DIAGNOSIS — R06.09 DYSPNEA ON EXERTION: Primary | ICD-10-CM

## 2018-08-15 DIAGNOSIS — M62.81 SUBJECTIVE MUSCLE WEAKNESS: ICD-10-CM

## 2018-08-15 ASSESSMENT — PAIN SCALES - GENERAL: PAINLEVEL: NO PAIN (0)

## 2018-08-15 NOTE — LETTER
Service Date: 08/15/2018      Daniella Ornelas, DO   [facility]    [address] [place] [address]      RE: Yaron Blair   MRN: [id number]   : 1975       Dear Dr. Ornelas:      I had the pleasure to see Mr. Blair in followup at the [facility].  He is a 43-year-old man with a history of random muscle pains or discomfort including stiffness or tightness in his calf, shoulder, thighs or jaw occasionally associated with fasciculations.  He had a positive VERNON at 1:640 and a borderline positive voltage gate potassium channel antibody.  For his cramps and twitching, he is on Tegretol intermittently 200 mg b.i.d., amitriptyline 30 mg a day and I also put him on prednisone last year.  He is currently on 15 mg daily.  He felt that the prednisone helped partially, at most 40%-50%.    In the last few months, Mr. Blair is complaining of increasing weakness, particularly in his arms.  He feels that his arms get heavy after lifting weights, especially carrying 1 tray of pizza on each hand.  His arms will shake and he feels that he has a tendency to give out.  He denies difficulty lifting arms overhead.  He does not have significant difficulty with chairs or ascending/descending stairs.  He also believes he has lost muscle bulk in the dorsal forearm which I was not able to confirm on my examination.  He has had repeatedly normal neuromuscular exams in the past.  When I first saw him, his highest CK was 260-280, which for a man of his build is normal.  His latest CK in 2017 was 103, which is fine.  He had 2 EMGs by me showing a mild sensory neuropathy on the second attempt, but no myopathic changes on either of the 2 studies. His vitamin D levels were normal.  He tells me his sweating excessively and feels hot, plus he runs out of breath when exerting himself. He denies orthopnea. He had his TSH checked last year which was fine.      CURRENT MEDICATIONS:   Reviewed and are as per Epic record.      PHYSICAL EXAMINATION:   VITAL SIGNS:   His blood pressure 118/78.  Pulse 88 and regular.  Respiratory rate 20.  O2 sat 96% on room air.  Weight 117.5 kilos.  Height is 181.  He endorsed no pain.     NEUROLOGIC examination remains entirely normal in terms of muscle examination.  He has normal neck flexion, extension, shoulder abduction, elbow flexion, elbow extension, wrist extension, finger extension, FDI, APB, hand , hip flexion, knee extension, knee flexion, foot dorsiflexion bilaterally.  He is able to rise from a chair 3 times with arms crossed on the chest without difficulties.  He is able to walk on his heels and toes normally.  He actually has quite large muscle bulk and no definite atrophy at any location.  Weakness does not have fatigable quality.  Reflexes are 2+ in biceps, triceps, brachioradialis, knees and 1+ at the ankles.     PFT Latest Ref Rng & Units 8/15/2018   FVC L 5.85   FEV1 L 4.75   FVC% % 117   FEV1% % 119        In summary, Mr. Blair has a history of cramps or twitching that maybe immune mediated given his positive VERNON and borderline positive voltage gate potassium channel antibody. I think he had a partial response of those symptoms to prednisone.  The etiology of his subjective muscle weakness is not clear to me.  There are no objective findings to support a diagnosis of myopathy. He has normal CK and repeatedly normal EMGs.  Along those lines, the possibility of inflammatory myopathy is extremely low.  I discussed the possibility that those could be side effects of the chronic prednisone use.  I instructed him to cut the prednisone down to 10 mg daily for 2 weeks then 5 mg daily for a month then go off it altogether.  He should then call me back.  If he feels his weakness is getting worse, then it may be reasonable to do a muscle MRI of deltoids or quadriceps to look for signs of inflammation and if those are present, tiesha should undergo a  "muscle biopsy. I don't want to do a \"blind\" biopsy just based on subjective complaints, because I have found the yield to be disappointingly low.   I told him that prednisone can sometimes cause excessive sweating as can some of the other drugs he is on. The amitriptyline and carbamazepine he is on can increase his sensation of fatigue.  His spirometry was outstanding today, making a neuromuscular cause for his dyspnea on exertion very unlikely. I think it likely reflects deconditioning.  Lastly, I discussed the possibility that his weakness may be age-related changes in the absence of a better explanation.  He understands the above.  He will follow up in our clinic in 4 months or sooner if necessary.      Sincerely,       Aquilino Webster MD      D: 08/15/2018   T: 08/15/2018   MT: GAVIOTA    Name:     MUSA PRESCOTT   MRN:      [id number]        Account:      [id number]   :      1975           Service Date: 08/15/2018    Document: [id number]    "

## 2018-08-15 NOTE — PROGRESS NOTES
Service Date: 08/15/2018      Daniella Ornelas, DO   [facility]    [address] [place] [address]      RE: Yaron Blair   MRN: [id number]   : 1975       Dear Dr. Ornelas:      I had the pleasure to see Mr. Blair in followup at the [facility].  He is a 43-year-old man with a history of random muscle pains or discomfort including stiffness or tightness in his calf, shoulder, thighs or jaw occasionally associated with fasciculations.  He had a positive VERNON at 1:640 and a borderline positive voltage gate potassium channel antibody.  For his cramps and twitching, he is on Tegretol intermittently 200 mg b.i.d., amitriptyline 30 mg a day and I also put him on prednisone last year.  He is currently on 15 mg daily.  He felt that the prednisone helped partially, at most 40%-50%.    In the last few months, Mr. Blair is complaining of increasing weakness, particularly in his arms.  He feels that his arms get heavy after lifting weights, especially carrying 1 tray of pizza on each hand.  His arms will shake and he feels that he has a tendency to give out.  He denies difficulty lifting arms overhead.  He does not have significant difficulty with chairs or ascending/descending stairs.  He also believes he has lost muscle bulk in the dorsal forearm which I was not able to confirm on my examination.  He has had repeatedly normal neuromuscular exams in the past.  When I first saw him, his highest CK was 260-280, which for a man of his build is normal.  His latest CK in 2017 was 103, which is fine.  He had 2 EMGs by me showing a mild sensory neuropathy on the second attempt, but no myopathic changes on either of the 2 studies. His vitamin D levels were normal.  He tells me his sweating excessively and feels hot, plus he runs out of breath when exerting himself. He denies orthopnea. He had his TSH checked last year which was fine.      CURRENT MEDICATIONS:  Reviewed  and are as per Epic record.      PHYSICAL EXAMINATION:   VITAL SIGNS:   His blood pressure 118/78.  Pulse 88 and regular.  Respiratory rate 20.  O2 sat 96% on room air.  Weight 117.5 kilos.  Height is 181.  He endorsed no pain.     NEUROLOGIC examination remains entirely normal in terms of muscle examination.  He has normal neck flexion, extension, shoulder abduction, elbow flexion, elbow extension, wrist extension, finger extension, FDI, APB, hand , hip flexion, knee extension, knee flexion, foot dorsiflexion bilaterally.  He is able to rise from a chair 3 times with arms crossed on the chest without difficulties.  He is able to walk on his heels and toes normally.  He actually has quite large muscle bulk and no definite atrophy at any location.  Weakness does not have fatigable quality.  Reflexes are 2+ in biceps, triceps, brachioradialis, knees and 1+ at the ankles.     PFT Latest Ref Rng & Units 8/15/2018   FVC L 5.85   FEV1 L 4.75   FVC% % 117   FEV1% % 119        In summary, Mr. Blair has a history of cramps or twitching that maybe immune mediated given his positive VERNON and borderline positive voltage gate potassium channel antibody. I think he had a partial response of those symptoms to prednisone.  The etiology of his subjective muscle weakness is not clear to me.  There are no objective findings to support a diagnosis of myopathy. He has normal CK and repeatedly normal EMGs.  Along those lines, the possibility of inflammatory myopathy is extremely low.  I discussed the possibility that those could be side effects of the chronic prednisone use.  I instructed him to cut the prednisone down to 10 mg daily for 2 weeks then 5 mg daily for a month then go off it altogether.  He should then call me back.  If he feels his weakness is getting worse, then it may be reasonable to do a muscle MRI of deltoids or quadriceps to look for signs of inflammation and if those are present, tiesha should undergo a muscle  "biopsy. I don't want to do a \"blind\" biopsy just based on subjective complaints, because I have found the yield to be disappointingly low.   I told him that prednisone can sometimes cause excessive sweating as can some of the other drugs he is on. The amitriptyline and carbamazepine he is on can increase his sensation of fatigue.  His spirometry was outstanding today, making a neuromuscular cause for his dyspnea on exertion very unlikely. I think it likely reflects deconditioning.  Lastly, I discussed the possibility that his weakness may be age-related changes in the absence of a better explanation.  He understands the above.  He will follow up in our clinic in 4 months or sooner if necessary.      Sincerely,       MD FLAQUITA García MD             D: 08/15/2018   T: 08/15/2018   MT: GAVIOTA      Name:     MUSA PRESCOTT   MRN:      [id number]        Account:      [id number]   :      1975           Service Date: 08/15/2018      Document: [id number]      "

## 2018-08-15 NOTE — MR AVS SNAPSHOT
After Visit Summary   8/15/2018    Yaron Blair    MRN: [id number]           Patient Information     Date Of Birth          1975        Visit Information        Provider Department      8/15/2018 7:50 AM Aquilino Webster MD [facility] Neurology        Today's Diagnoses     Dyspnea on exertion    -  1      Care Instructions    I would like you to gradually stop the prednisone. Take 10 mg daily for 2 weeks, then 5 mg daily for 1 month, then stop it altogether.  Please call our office or send Zarpo message after stopping the prednisone. If your weakness is getting worse I think we should revisit your diagnosis and do some more tests (like a muscle MRI) in case this is an inflammatory muscle disease. If we find evidence of this diagnosis on the MRI you will need a muscle biopsy. I have to say that so far my suspicion for inflammatory muscle disease based on your exam and EMG tests was zero.  If your weakness gets better off the steroids, then it was probably a side effect of the drug.  Also remember that steroids can increase somebody's sweating, too.    Fatigue may also be a side effect of the amitryptiline or tegretol you are taking. I don't want to stop those drugs right now but down the way we may have to revisit the issue.    Return to Clinic in about 4 months please.           Follow-ups after your visit        Follow-up notes from your care team     Return in about 4 months (around 12/15/2018).      Your next 10 appointments already scheduled     Aug 15, 2018  8:30 AM CDT   PFT VISIT with  PFL GRANT   [facility] Pulmonary Function Testing ([facility])    [address] [place] [id number]   [phone]            Dec 13, 2018 12:00 PM CST   (Arrive by 11:45 AM)   Return Neuropathy Visit with Aquilino Webster MD   [facility] Neurology ([facility])    [address] [place] [address]  "[id number] [phone]              Future tests that were ordered for you today     Open Future Orders        Priority Expected Expires Ordered    PFT General Lab Testing Routine  8/15/2019 8/15/2018            Who to contact     Please call your clinic at [phone] to:    Ask questions about your health    Make or cancel appointments    Discuss your medicines    Learn about your test results    Speak to your doctor            Additional Information About Your Visit        [employer] Information     [employer] gives you secure access to your electronic health record. If you see a primary care provider, you can also send messages to your care team and make appointments. If you have questions, please call your primary care clinic.  If you do not have a primary care provider, please call [phone] and they will assist you.      [employer] is an electronic gateway that provides easy, online access to your medical records. With [employer], you can request a clinic appointment, read your test results, renew a prescription or communicate with your care team.     To access your existing account, please contact your [facility] or call [phone] for assistance.        Care EveryWhere ID     This is your Care EveryWhere ID. This could be used by other organizations to access your [place] medical records  [id number]        Your Vitals Were     Pulse Respirations Height Pulse Oximetry BMI (Body Mass Index)       88 20 1.816 m (5' 11.5\") 96% 35.63 kg/m2        Blood Pressure from Last 3 Encounters:   08/15/18 118/78   03/14/18 136/85   12/06/17 127/89    Weight from Last 3 Encounters:   08/15/18 117.5 kg (259 lb 1.6 oz)   03/14/18 118 kg (260 lb 3.2 oz)   12/06/17 115.2 kg (254 lb)               Primary Care Provider Office Phone # Fax #    Daniella Ornelas [phone]       [facility] [address] [facility] [address]        Equal Access to Services     CLARI ROJAS AH: " Hadii jennie Ingram, waaxda luqadaha, qaybta kaalrosina block, odette kayliein hayaajane warrenrosita kilgore laleroyjane kendy. So [facility] [phone].    ATENCIÓN: Si habla bubba, tiene a gomez disposición servicios gratuitos de asistencia lingüística. Llame al [phone].    We comply with applicable federal civil rights laws and [place] laws. We do not discriminate on the basis of race, color, national origin, age, disability, sex, sexual orientation, or gender identity.            Thank you!     Thank you for choosing [facility] NEUROLOGY  for your care. Our goal is always to provide you with excellent care. Hearing back from our patients is one way we can continue to improve our services. Please take a few minutes to complete the written survey that you may receive in the mail after your visit with us. Thank you!             Your Updated Medication List - Protect others around you: Learn how to safely use, store and throw away your medicines at www.disposemymeds.org.          This list is accurate as of 8/15/18  8:03 AM.  Always use your most recent med list.                   Brand Name Dispense Instructions for use Diagnosis    amitriptyline 10 MG tablet    ELAVIL     Take 3 tablets by mouth daily        carBAMazepine 100 MG 12 hr tablet    TEGretol XR     Take 2 tablets by mouth 2 times daily        FLONASE 50 MCG/ACT spray   Generic drug:  fluticasone      Spray 1 puff in nostril as needed        omeprazole 20 MG CR capsule    priLOSEC     Take 1 capsule by mouth daily        predniSONE 5 MG tablet    DELTASONE    90 tablet    Take 3 tablets (15 mg) by mouth daily    Benign fasciculation-cramp syndrome (H)

## 2018-08-15 NOTE — PATIENT INSTRUCTIONS
I would like you to gradually stop the prednisone. Take 10 mg daily for 2 weeks, then 5 mg daily for 1 month, then stop it altogether.  Please call our office or send IncreaseCard message after stopping the prednisone. If your weakness is getting worse I think we should revisit your diagnosis and do some more tests (like a muscle MRI) in case this is an inflammatory muscle disease. If we find evidence of this diagnosis on the MRI you will need a muscle biopsy. I have to say that so far my suspicion for inflammatory muscle disease based on your exam and EMG tests was zero.  If your weakness gets better off the steroids, then it was probably a side effect of the drug.  Also remember that steroids can increase somebody's sweating, too.    Fatigue may also be a side effect of the amitryptiline or tegretol you are taking. I don't want to stop those drugs right now but down the way we may have to revisit the issue.    Return to Clinic in about 4 months please.

## 2018-10-01 LAB
EXPTIME-PRE: 8.47 SEC
FEF2575-%PRED-PRE: 120 %
FEF2575-PRE: 4.53 L/SEC
FEF2575-PRED: 3.77 L/SEC
FEFMAX-%PRED-PRE: 108 %
FEFMAX-PRE: 11.2 L/SEC
FEFMAX-PRED: 10.36 L/SEC
FEV1-%PRED-PRE: 119 %
FEV1-PRE: 4.75 L
FEV1FEV6-PRE: 81 %
FEV1FEV6-PRED: 81 %
FEV1FVC-PRE: 81 %
FEV1FVC-PRED: 79 %
FIFMAX-PRE: 7.12 L/SEC
FVC-%PRED-PRE: 117 %
FVC-PRE: 5.85 L
FVC-PRED: 4.96 L
MEP-PRE: 150 CMH2O
MIP-PRE: -110 CMH2O

## 2018-10-15 ENCOUNTER — HOSPITAL ENCOUNTER (OUTPATIENT)
Dept: MRI IMAGING | Facility: CLINIC | Age: 43
End: 2018-10-15
Attending: PSYCHIATRY & NEUROLOGY
Payer: COMMERCIAL

## 2018-10-15 ENCOUNTER — HOSPITAL ENCOUNTER (OUTPATIENT)
Dept: MRI IMAGING | Facility: CLINIC | Age: 43
Discharge: HOME | End: 2018-10-15
Attending: PSYCHIATRY & NEUROLOGY | Admitting: PSYCHIATRY & NEUROLOGY
Payer: COMMERCIAL

## 2018-10-15 DIAGNOSIS — M60.9 MYOSITIS, UNSPECIFIED MYOSITIS TYPE, UNSPECIFIED SITE: ICD-10-CM

## 2018-10-15 PROCEDURE — 73221 MRI JOINT UPR EXTREM W/O DYE: CPT | Mod: RT

## 2018-10-15 PROCEDURE — 73718 MRI LOWER EXTREMITY W/O DYE: CPT | Mod: RT

## 2018-12-06 ASSESSMENT — ENCOUNTER SYMPTOMS
SPEECH CHANGE: 0
DIZZINESS: 0
MEMORY LOSS: 0
LOSS OF CONSCIOUSNESS: 0
ARTHRALGIAS: 0
NECK PAIN: 0
DECREASED APPETITE: 0
TINGLING: 0
INCREASED ENERGY: 0
PARALYSIS: 0
COUGH: 0
SEIZURES: 0
MUSCLE CRAMPS: 1
COUGH DISTURBING SLEEP: 0
SNORES LOUDLY: 0
HEADACHES: 0
CHILLS: 0
HEMOPTYSIS: 0
STIFFNESS: 0
POLYPHAGIA: 0
DYSPNEA ON EXERTION: 0
BACK PAIN: 0
FEVER: 0
NERVOUS/ANXIOUS: 0
DEPRESSION: 0
NIGHT SWEATS: 1
PANIC: 0
SPUTUM PRODUCTION: 0
ALTERED TEMPERATURE REGULATION: 1
WEIGHT LOSS: 0
DECREASED CONCENTRATION: 0
TREMORS: 0
SHORTNESS OF BREATH: 1
JOINT SWELLING: 0
POLYDIPSIA: 0
HALLUCINATIONS: 0
MUSCLE WEAKNESS: 1
WEIGHT GAIN: 0
MYALGIAS: 1
WEAKNESS: 1
FATIGUE: 1
INSOMNIA: 1
NUMBNESS: 0
DISTURBANCES IN COORDINATION: 0
POSTURAL DYSPNEA: 0
WHEEZING: 0

## 2018-12-13 ENCOUNTER — OFFICE VISIT (OUTPATIENT)
Dept: NEUROLOGY | Facility: CLINIC | Age: 43
End: 2018-12-13
Payer: COMMERCIAL

## 2018-12-13 VITALS
DIASTOLIC BLOOD PRESSURE: 81 MMHG | OXYGEN SATURATION: 98 % | SYSTOLIC BLOOD PRESSURE: 132 MMHG | BODY MASS INDEX: 35.08 KG/M2 | WEIGHT: 259 LBS | HEIGHT: 72 IN | HEART RATE: 95 BPM

## 2018-12-13 DIAGNOSIS — R25.3 BENIGN FASCICULATION-CRAMP SYNDROME: Primary | ICD-10-CM

## 2018-12-13 DIAGNOSIS — M62.81 SUBJECTIVE MUSCLE WEAKNESS: ICD-10-CM

## 2018-12-13 RX ORDER — INFLUENZA A VIRUS A/VICTORIA/2454/2019 IVR-207 (H1N1) ANTIGEN (PROPIOLACTONE INACTIVATED), INFLUENZA A VIRUS A/HONG KONG/2671/2019 IVR-208 (H3N2) ANTIGEN (PROPIOLACTONE INACTIVATED), INFLUENZA B VIRUS B/VICTORIA/705/2018 BVR-11 ANTIGEN (PROPIOLACTONE INACTIVATED), INFLUENZA B VIRUS B/PHUKET/3073/2013 BVR-1B ANTIGEN (PROPIOLACTONE INACTIVATED) 15; 15; 15; 15 UG/.5ML; UG/.5ML; UG/.5ML; UG/.5ML
INJECTION, SUSPENSION INTRAMUSCULAR
COMMUNITY
Start: 2018-10-25

## 2018-12-13 ASSESSMENT — PAIN SCALES - GENERAL: PAINLEVEL: NO PAIN (0)

## 2018-12-13 ASSESSMENT — MIFFLIN-ST. JEOR: SCORE: 2089.88

## 2018-12-13 NOTE — PATIENT INSTRUCTIONS
Please follow up with your primary care doctor. Either you or your primary, feel free to call with any questions, concerns or changes of symptoms.

## 2018-12-13 NOTE — LETTER
2018       RE: Yaron Blair  [address] [place] [address]     Dear Colleague,    Thank you for referring your patient, Yaron Blair, to the [facility] NEUROLOGY at [facility]. Please see a copy of my visit note below.    2018        Daniella Ornelas [facility]    [address] [place] [address]      RE: Yaron Blair   MRN: [id number]    : 1975        Dear Dr. Ornelas:        I had the pleasure to see Mr. Blair in followup at the [facility].  As you know, he is a 43-year-old man with a history of random muscle pains and cramps/fasciculations.  He had a positive VERNON at 1:640 and a borderline positive voltage gate potassium channel antibody.  For his cramps and twitching he used to be on Tegretol, but this was gradually stopped.  He is on amitriptyline 20-30 mg at night which helps him sleep and reduce the muscle stiffness a little bit.  Because of the positive VERNON, I had a modest suspicion for autoimmune disorder and put him on a low-dose prednisone.  His latest CK in 2017 was normal.  He has had 2 EMGs by me showing absolutely no myopathic changes.  I asked him to taper the steroids in 2018 and he gradually reduced the prednisone dose to 10 mg daily for 2 weeks, then 5 mg daily for a month and tried to go off it, but he experienced worsening stiffness, pain and fatigue.  At that time I had him undergo an MRI of the shoulder and the femur.  Those were done without contrast on 10/15.  There was no evidence of myositis. He still complains of the same arm>leg fatigue and subjective weakness, although I have never been able to see anything on his objective exam.  He has no new neurological symptoms otherwise.      Medications were reviewed and are as per Epic record.      On physical exam, his blood pressure is 132/81, pulse 95 and regular, O2 sat 98% room air, weight is 117.5 kilos,  height is 181.  He endorses no pain.  On neuro exam, his strength is 5 out of 5 in deltoid, biceps, triceps, wrist extensors, finger extensors, FDI, APB, hand , hip flexion, knee extension, knee flexion, foot dorsiflexion, hip adduction and abduction bilaterally.  I see absolutely no muscle atrophy or fasciculations anywhere.  His neck flexion and extension strength is normal.  His shoulder shrug is normal.  There is no weakness of orbicularis oculi, oris, uvula, jaw, palate or tongue.  There is no dysarthria or dysphonia.  There is no ptosis after 30 seconds of sustained upward gaze and no diplopia.  He can rise from a chair with arms crossed on the chest 5 times with absolutely no difficulty.      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  I told him that the evidence for autoimmune etiology in his case is equivocal or weak at best, because it is only based on a positive VERNON without any other features strongly suggestive of an autoimmune neuromuscular syndrome. The voltage gated potassium channel antibodies were borderline elevated and CASPR2 antibodies, which are more specifc, were negative. He does not have myositis, myasthenia or any other sinister cause of his subjective weakness, which, as above stated, I am unable to confirm by multiple physical exams between 2017 and 2018. 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     I reassured him that I do not think he has a sinister cause of the weakness. I think he should continue prednisone between 15 and 20 mg a day and amitriptyline in the current dose.  I asked him to follow with his primary care provider.  The patient's amitriptyline use may require periodic EKG once per year.  I am sure you are familiar with the routine care that patients on chronic oral prednisone require, including monitoring for diabetes, hypertension, annual  ophthalmologic examination for cataracts and glaucoma, taking calcium and vitamin D, monitoring for osteoporosis, etc. I will leave this to your discretion. If he has any worsening of his symptoms in the future do not hesitate to contact me.        Sincerely,        Aquilino Webster MD    Service Date: 2018           D: 2018   T: 2018   MT: yassine      Name:     MUSA PRESCOTT   MRN:      [id number]        Account:      [id number]   :      1975           Service Date: 2018      Document: [id number]

## 2018-12-13 NOTE — PROGRESS NOTES
2018        Daniella Ornelas DO   [facility]    [address] [place] [address]      RE: Yaron Blair   MRN: [id number]    : 1975        Dear Dr. Ornelas:        I had the pleasure to see Mr. Blair in followup at the [facility].  As you know, he is a 43-year-old man with a history of random muscle pains and cramps/fasciculations.  He had a positive VERNON at 1:640 and a borderline positive voltage gate potassium channel antibody.  For his cramps and twitching he used to be on Tegretol, but this was gradually stopped.  He is on amitriptyline 20-30 mg at night which helps him sleep and reduce the muscle stiffness a little bit.  Because of the positive VERNON, I had a modest suspicion for autoimmune disorder and put him on a low-dose prednisone.  His latest CK in 2017 was normal.  He has had 2 EMGs by me showing absolutely no myopathic changes.  I asked him to taper the steroids in 2018 and he gradually reduced the prednisone dose to 10 mg daily for 2 weeks, then 5 mg daily for a month and tried to go off it, but he experienced worsening stiffness, pain and fatigue.  At that time I had him undergo an MRI of the shoulder and the femur.  Those were done without contrast on 10/15.  There was no evidence of myositis. He still complains of the same arm>leg fatigue and subjective weakness, although I have never been able to see anything on his objective exam.  He has no new neurological symptoms otherwise.      Medications were reviewed and are as per Epic record.      On physical exam, his blood pressure is 132/81, pulse 95 and regular, O2 sat 98% room air, weight is 117.5 kilos, height is 181.  He endorses no pain.  On neuro exam, his strength is 5 out of 5 in deltoid, biceps, triceps, wrist extensors, finger extensors, FDI, APB, hand , hip flexion, knee extension, knee flexion, foot dorsiflexion, hip adduction and abduction bilaterally.  I see  absolutely no muscle atrophy or fasciculations anywhere.  His neck flexion and extension strength is normal.  His shoulder shrug is normal.  There is no weakness of orbicularis oculi, oris, uvula, jaw, palate or tongue.  There is no dysarthria or dysphonia.  There is no ptosis after 30 seconds of sustained upward gaze and no diplopia.  He can rise from a chair with arms crossed on the chest 5 times with absolutely no difficulty.      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  I told him that the evidence for autoimmune etiology in his case is equivocal or weak at best, because it is only based on a positive VERNON without any other features strongly suggestive of an autoimmune neuromuscular syndrome. The voltage gated potassium channel antibodies were borderline elevated and CASPR2 antibodies, which are more specifc, were negative. He does not have myositis, myasthenia or any other sinister cause of his subjective weakness, which, as above stated, I am unable to confirm by multiple physical exams between 2017 and 2018. 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     I reassured him that I do not think he has a sinister cause of the weakness. I think he should continue prednisone between 15 and 20 mg a day and amitriptyline in the current dose.  I asked him to follow with his primary care provider.  The patient's amitriptyline use may require periodic EKG once per year.  I am sure you are familiar with the routine care that patients on chronic oral prednisone require, including monitoring for diabetes, hypertension, annual ophthalmologic examination for cataracts and glaucoma, taking calcium and vitamin D, monitoring for osteoporosis, etc. I will leave this to your discretion. If he has any worsening of his symptoms in the future do not hesitate to contact me.        Sincerely,        Aquilino  MD FLAQUITA Mcdaniel MD       Answers for HPI/ROS submitted by the patient on 2018   General Symptoms: Yes  Skin Symptoms: No  HENT Symptoms: No  EYE SYMPTOMS: No  HEART SYMPTOMS: No  LUNG SYMPTOMS: Yes  INTESTINAL SYMPTOMS: No  URINARY SYMPTOMS: No  REPRODUCTIVE SYMPTOMS: No  SKELETAL SYMPTOMS: Yes  BLOOD SYMPTOMS: No  NERVOUS SYSTEM SYMPTOMS: Yes  MENTAL HEALTH SYMPTOMS: Yes  Fever: No  Loss of appetite: No  Weight loss: No  Weight gain: No  Fatigue: Yes  Night sweats: Yes  Chills: No  Increased stress: No  Excessive hunger: No  Excessive thirst: No  Feeling hot or cold when others believe the temperature is normal: Yes  Loss of height: No  Post-operative complications: No  Surgical site pain: No  Hallucinations: No  Change in or Loss of Energy: No  Hyperactivity: No  Confusion: No  Cough: No  Sputum or phlegm: No  Coughing up blood: No  Difficulty breating or shortness of breath: Yes  Snoring: No  Wheezing: No  Difficulty breathing on exertion: No  Nighttime Cough: No  Difficulty breathing when lying flat: No  Back pain: No  Muscle aches: Yes  Neck pain: No  Swollen joints: No  Joint pain: No  Bone pain: No  Muscle cramps: Yes  Muscle weakness: Yes  Joint stiffness: No  Bone fracture: No  Trouble with coordination: No  Dizziness or trouble with balance: No  Fainting or black-out spells: No  Memory loss: No  Headache: No  Seizures: No  Speech problems: No  Tingling: No  Tremor: No  Weakness: Yes  Difficulty walking: No  Paralysis: No  Numbness: No  Nervous or Anxious: No  Depression: No  Trouble sleeping: Yes  Trouble thinking or concentrating: No  Mood changes: No  Panic attacks: No  Service Date: 2018           D: 2018   T: 2018   MT: tb      Name:     MUSA PRESCOTT   MRN:      [id number]        Account:      [id number]   :      1975           Service Date: 2018      Document: [id number]

## 2018-12-29 DIAGNOSIS — R25.3 BENIGN FASCICULATION-CRAMP SYNDROME: ICD-10-CM

## 2019-01-02 ENCOUNTER — MYC REFILL (OUTPATIENT)
Dept: NEUROLOGY | Facility: CLINIC | Age: 44
End: 2019-01-02

## 2019-01-02 DIAGNOSIS — R25.3 BENIGN FASCICULATION-CRAMP SYNDROME: ICD-10-CM

## 2019-01-02 RX ORDER — PREDNISONE 5 MG/1
TABLET ORAL
Qty: 90 TABLET | Refills: 0 | OUTPATIENT
Start: 2019-01-02

## 2019-01-03 RX ORDER — PREDNISONE 5 MG/1
15 TABLET ORAL DAILY
Qty: 90 TABLET | Refills: 0 | Status: SHIPPED | OUTPATIENT
Start: 2019-01-03

## 2019-11-02 ENCOUNTER — HEALTH MAINTENANCE LETTER (OUTPATIENT)
Age: 44
End: 2019-11-02

## 2020-11-14 ENCOUNTER — HEALTH MAINTENANCE LETTER (OUTPATIENT)
Age: 45
End: 2020-11-14

## 2021-09-12 ENCOUNTER — HEALTH MAINTENANCE LETTER (OUTPATIENT)
Age: 46
End: 2021-09-12

## 2022-01-02 ENCOUNTER — HEALTH MAINTENANCE LETTER (OUTPATIENT)
Age: 47
End: 2022-01-02

## 2022-11-19 ENCOUNTER — HEALTH MAINTENANCE LETTER (OUTPATIENT)
Age: 47
End: 2022-11-19

## 2023-04-09 ENCOUNTER — HEALTH MAINTENANCE LETTER (OUTPATIENT)
Age: 48
End: 2023-04-09

## (undated) RX ORDER — LIDOCAINE HYDROCHLORIDE 10 MG/ML
INJECTION, SOLUTION EPIDURAL; INFILTRATION; INTRACAUDAL; PERINEURAL
Status: DISPENSED
Start: 2017-04-18